# Patient Record
Sex: MALE | Race: WHITE | NOT HISPANIC OR LATINO
[De-identification: names, ages, dates, MRNs, and addresses within clinical notes are randomized per-mention and may not be internally consistent; named-entity substitution may affect disease eponyms.]

---

## 2017-06-27 ENCOUNTER — APPOINTMENT (OUTPATIENT)
Dept: GASTROENTEROLOGY | Facility: CLINIC | Age: 72
End: 2017-06-27

## 2017-06-27 VITALS
BODY MASS INDEX: 26.82 KG/M2 | WEIGHT: 161 LBS | DIASTOLIC BLOOD PRESSURE: 66 MMHG | HEIGHT: 65 IN | SYSTOLIC BLOOD PRESSURE: 120 MMHG | TEMPERATURE: 98.3 F

## 2017-06-27 DIAGNOSIS — Z87.891 PERSONAL HISTORY OF NICOTINE DEPENDENCE: ICD-10-CM

## 2017-06-27 DIAGNOSIS — Z78.9 OTHER SPECIFIED HEALTH STATUS: ICD-10-CM

## 2017-06-27 DIAGNOSIS — R13.10 DYSPHAGIA, UNSPECIFIED: ICD-10-CM

## 2017-06-29 ENCOUNTER — OUTPATIENT (OUTPATIENT)
Dept: OUTPATIENT SERVICES | Facility: HOSPITAL | Age: 72
LOS: 1 days | End: 2017-06-29
Payer: MEDICARE

## 2017-06-29 ENCOUNTER — APPOINTMENT (OUTPATIENT)
Dept: RADIOLOGY | Facility: HOSPITAL | Age: 72
End: 2017-06-29

## 2017-06-29 DIAGNOSIS — R13.10 DYSPHAGIA, UNSPECIFIED: ICD-10-CM

## 2017-06-29 PROCEDURE — 74220 X-RAY XM ESOPHAGUS 1CNTRST: CPT

## 2017-07-14 ENCOUNTER — OUTPATIENT (OUTPATIENT)
Dept: OUTPATIENT SERVICES | Facility: HOSPITAL | Age: 72
LOS: 1 days | End: 2017-07-14
Payer: MEDICARE

## 2017-07-14 DIAGNOSIS — K22.2 ESOPHAGEAL OBSTRUCTION: ICD-10-CM

## 2017-07-14 PROCEDURE — 43235 EGD DIAGNOSTIC BRUSH WASH: CPT

## 2017-08-30 ENCOUNTER — APPOINTMENT (OUTPATIENT)
Dept: GASTROENTEROLOGY | Facility: CLINIC | Age: 72
End: 2017-08-30
Payer: MEDICARE

## 2017-08-30 VITALS
WEIGHT: 165 LBS | DIASTOLIC BLOOD PRESSURE: 62 MMHG | TEMPERATURE: 98.9 F | SYSTOLIC BLOOD PRESSURE: 138 MMHG | BODY MASS INDEX: 27.49 KG/M2 | HEIGHT: 65 IN

## 2017-08-30 DIAGNOSIS — K21.0 GASTRO-ESOPHAGEAL REFLUX DISEASE WITH ESOPHAGITIS: ICD-10-CM

## 2017-08-30 PROCEDURE — 99213 OFFICE O/P EST LOW 20 MIN: CPT

## 2017-08-30 RX ORDER — ASPIRIN 81 MG/1
81 TABLET ORAL DAILY
Refills: 0 | Status: DISCONTINUED | COMMUNITY

## 2017-09-05 ENCOUNTER — APPOINTMENT (OUTPATIENT)
Dept: GASTROENTEROLOGY | Facility: CLINIC | Age: 72
End: 2017-09-05
Payer: MEDICARE

## 2017-09-06 ENCOUNTER — APPOINTMENT (OUTPATIENT)
Dept: GASTROENTEROLOGY | Facility: CLINIC | Age: 72
End: 2017-09-06
Payer: MEDICARE

## 2017-09-06 PROCEDURE — 45378 DIAGNOSTIC COLONOSCOPY: CPT

## 2017-09-13 ENCOUNTER — APPOINTMENT (OUTPATIENT)
Dept: GASTROENTEROLOGY | Facility: CLINIC | Age: 72
End: 2017-09-13

## 2017-11-06 ENCOUNTER — RESULT REVIEW (OUTPATIENT)
Age: 72
End: 2017-11-06

## 2017-11-06 ENCOUNTER — OUTPATIENT (OUTPATIENT)
Dept: OUTPATIENT SERVICES | Facility: HOSPITAL | Age: 72
LOS: 1 days | End: 2017-11-06
Payer: MEDICARE

## 2017-11-06 DIAGNOSIS — K20.9 ESOPHAGITIS, UNSPECIFIED: ICD-10-CM

## 2017-11-06 LAB — GLUCOSE BLDC GLUCOMTR-MCNC: 108 MG/DL — HIGH (ref 70–99)

## 2017-11-06 PROCEDURE — 88305 TISSUE EXAM BY PATHOLOGIST: CPT | Mod: 26

## 2017-11-06 PROCEDURE — 43239 EGD BIOPSY SINGLE/MULTIPLE: CPT

## 2017-11-06 PROCEDURE — 88312 SPECIAL STAINS GROUP 1: CPT | Mod: 26

## 2017-11-06 PROCEDURE — 88305 TISSUE EXAM BY PATHOLOGIST: CPT

## 2017-11-06 PROCEDURE — 82962 GLUCOSE BLOOD TEST: CPT

## 2017-11-06 PROCEDURE — 88312 SPECIAL STAINS GROUP 1: CPT

## 2017-11-27 ENCOUNTER — EMERGENCY (EMERGENCY)
Facility: HOSPITAL | Age: 72
LOS: 1 days | Discharge: HOME | End: 2017-11-27
Attending: EMERGENCY MEDICINE | Admitting: EMERGENCY MEDICINE
Payer: MEDICARE

## 2017-11-27 VITALS
RESPIRATION RATE: 16 BRPM | DIASTOLIC BLOOD PRESSURE: 69 MMHG | TEMPERATURE: 99 F | OXYGEN SATURATION: 100 % | HEART RATE: 97 BPM | SYSTOLIC BLOOD PRESSURE: 157 MMHG

## 2017-11-27 LAB
ALBUMIN SERPL ELPH-MCNC: 4 G/DL — SIGNIFICANT CHANGE UP (ref 3.3–5)
ALP SERPL-CCNC: 52 U/L — SIGNIFICANT CHANGE UP (ref 40–120)
ALT FLD-CCNC: 21 U/L RC — SIGNIFICANT CHANGE UP (ref 10–45)
ANION GAP SERPL CALC-SCNC: 13 MMOL/L — SIGNIFICANT CHANGE UP (ref 5–17)
APTT BLD: 28.6 SEC — SIGNIFICANT CHANGE UP (ref 27.5–37.4)
AST SERPL-CCNC: 23 U/L — SIGNIFICANT CHANGE UP (ref 10–40)
BASE EXCESS BLDV CALC-SCNC: 1.6 MMOL/L — SIGNIFICANT CHANGE UP (ref -2–2)
BASOPHILS # BLD AUTO: 0 K/UL — SIGNIFICANT CHANGE UP (ref 0–0.2)
BASOPHILS NFR BLD AUTO: 0.5 % — SIGNIFICANT CHANGE UP (ref 0–2)
BILIRUB SERPL-MCNC: 0.4 MG/DL — SIGNIFICANT CHANGE UP (ref 0.2–1.2)
BUN SERPL-MCNC: 24 MG/DL — HIGH (ref 7–23)
CA-I SERPL-SCNC: 1.22 MMOL/L — SIGNIFICANT CHANGE UP (ref 1.12–1.3)
CALCIUM SERPL-MCNC: 9.1 MG/DL — SIGNIFICANT CHANGE UP (ref 8.4–10.5)
CHLORIDE BLDV-SCNC: 104 MMOL/L — SIGNIFICANT CHANGE UP (ref 96–108)
CHLORIDE SERPL-SCNC: 99 MMOL/L — SIGNIFICANT CHANGE UP (ref 96–108)
CO2 BLDV-SCNC: 30 MMOL/L — SIGNIFICANT CHANGE UP (ref 22–30)
CO2 SERPL-SCNC: 25 MMOL/L — SIGNIFICANT CHANGE UP (ref 22–31)
CREAT SERPL-MCNC: 1.07 MG/DL — SIGNIFICANT CHANGE UP (ref 0.5–1.3)
CRP SERPL-MCNC: 5.4 MG/DL — HIGH (ref 0–0.4)
EOSINOPHIL # BLD AUTO: 0.1 K/UL — SIGNIFICANT CHANGE UP (ref 0–0.5)
EOSINOPHIL NFR BLD AUTO: 1.1 % — SIGNIFICANT CHANGE UP (ref 0–6)
ERYTHROCYTE [SEDIMENTATION RATE] IN BLOOD: 26 MM/HR — HIGH (ref 0–20)
GAS PNL BLDV: 135 MMOL/L — LOW (ref 136–145)
GAS PNL BLDV: SIGNIFICANT CHANGE UP
GLUCOSE BLDV-MCNC: 213 MG/DL — HIGH (ref 70–99)
GLUCOSE SERPL-MCNC: 208 MG/DL — HIGH (ref 70–99)
HCO3 BLDV-SCNC: 28 MMOL/L — SIGNIFICANT CHANGE UP (ref 21–29)
HCT VFR BLD CALC: 42.1 % — SIGNIFICANT CHANGE UP (ref 39–50)
HCT VFR BLDA CALC: 45 % — SIGNIFICANT CHANGE UP (ref 39–50)
HGB BLD CALC-MCNC: 14.5 G/DL — SIGNIFICANT CHANGE UP (ref 13–17)
HGB BLD-MCNC: 14.2 G/DL — SIGNIFICANT CHANGE UP (ref 13–17)
INR BLD: 1.09 RATIO — SIGNIFICANT CHANGE UP (ref 0.88–1.16)
LACTATE BLDV-MCNC: 1.9 MMOL/L — SIGNIFICANT CHANGE UP (ref 0.7–2)
LYMPHOCYTES # BLD AUTO: 1.1 K/UL — SIGNIFICANT CHANGE UP (ref 1–3.3)
LYMPHOCYTES # BLD AUTO: 11.4 % — LOW (ref 13–44)
MCHC RBC-ENTMCNC: 31.4 PG — SIGNIFICANT CHANGE UP (ref 27–34)
MCHC RBC-ENTMCNC: 33.6 GM/DL — SIGNIFICANT CHANGE UP (ref 32–36)
MCV RBC AUTO: 93.3 FL — SIGNIFICANT CHANGE UP (ref 80–100)
MONOCYTES # BLD AUTO: 0.8 K/UL — SIGNIFICANT CHANGE UP (ref 0–0.9)
MONOCYTES NFR BLD AUTO: 8.3 % — SIGNIFICANT CHANGE UP (ref 2–14)
NEUTROPHILS # BLD AUTO: 7.4 K/UL — SIGNIFICANT CHANGE UP (ref 1.8–7.4)
NEUTROPHILS NFR BLD AUTO: 78.7 % — HIGH (ref 43–77)
OTHER CELLS CSF MANUAL: 10 ML/DL — LOW (ref 18–22)
PCO2 BLDV: 54 MMHG — HIGH (ref 35–50)
PH BLDV: 7.33 — LOW (ref 7.35–7.45)
PLATELET # BLD AUTO: 259 K/UL — SIGNIFICANT CHANGE UP (ref 150–400)
PO2 BLDV: 32 MMHG — SIGNIFICANT CHANGE UP (ref 25–45)
POTASSIUM BLDV-SCNC: 4.2 MMOL/L — SIGNIFICANT CHANGE UP (ref 3.5–5)
POTASSIUM SERPL-MCNC: 4.2 MMOL/L — SIGNIFICANT CHANGE UP (ref 3.5–5.3)
POTASSIUM SERPL-SCNC: 4.2 MMOL/L — SIGNIFICANT CHANGE UP (ref 3.5–5.3)
PROT SERPL-MCNC: 7.2 G/DL — SIGNIFICANT CHANGE UP (ref 6–8.3)
PROTHROM AB SERPL-ACNC: 11.8 SEC — SIGNIFICANT CHANGE UP (ref 9.8–12.7)
RBC # BLD: 4.51 M/UL — SIGNIFICANT CHANGE UP (ref 4.2–5.8)
RBC # FLD: 11.6 % — SIGNIFICANT CHANGE UP (ref 10.3–14.5)
SAO2 % BLDV: 52 % — LOW (ref 67–88)
SODIUM SERPL-SCNC: 137 MMOL/L — SIGNIFICANT CHANGE UP (ref 135–145)
WBC # BLD: 9.4 K/UL — SIGNIFICANT CHANGE UP (ref 3.8–10.5)
WBC # FLD AUTO: 9.4 K/UL — SIGNIFICANT CHANGE UP (ref 3.8–10.5)

## 2017-11-27 PROCEDURE — 93010 ELECTROCARDIOGRAM REPORT: CPT | Mod: 59,GC

## 2017-11-27 PROCEDURE — 20610 DRAIN/INJ JOINT/BURSA W/O US: CPT | Mod: GC

## 2017-11-27 PROCEDURE — 73564 X-RAY EXAM KNEE 4 OR MORE: CPT | Mod: 26,RT

## 2017-11-27 PROCEDURE — 71020: CPT | Mod: 26

## 2017-11-27 PROCEDURE — 99218: CPT | Mod: 25,GC

## 2017-11-27 RX ORDER — SODIUM CHLORIDE 9 MG/ML
3 INJECTION INTRAMUSCULAR; INTRAVENOUS; SUBCUTANEOUS EVERY 8 HOURS
Qty: 0 | Refills: 0 | Status: DISCONTINUED | OUTPATIENT
Start: 2017-11-27 | End: 2017-12-01

## 2017-11-27 RX ORDER — SODIUM CHLORIDE 9 MG/ML
1000 INJECTION INTRAMUSCULAR; INTRAVENOUS; SUBCUTANEOUS ONCE
Qty: 0 | Refills: 0 | Status: COMPLETED | OUTPATIENT
Start: 2017-11-27 | End: 2017-11-27

## 2017-11-27 RX ORDER — CLONAZEPAM 1 MG
0.5 TABLET ORAL AT BEDTIME
Qty: 0 | Refills: 0 | Status: COMPLETED | OUTPATIENT
Start: 2017-11-27 | End: 2017-12-04

## 2017-11-27 RX ADMIN — SODIUM CHLORIDE 2000 MILLILITER(S): 9 INJECTION INTRAMUSCULAR; INTRAVENOUS; SUBCUTANEOUS at 19:10

## 2017-11-27 RX ADMIN — Medication 100 MILLIGRAM(S): at 19:12

## 2017-11-27 RX ADMIN — Medication 0.5 MILLIGRAM(S): at 22:30
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
## 2017-11-27 NOTE — ED PROCEDURE NOTE - CPROC ED POST PROC CARE GUIDE1
Elevate the injured extremity as instructed./Instructed patient/caregiver to follow-up with primary care physician.
Instructed patient/caregiver regarding signs and symptoms of infection./Keep the cast/splint/dressing clean and dry./Instructed patient/caregiver to follow-up with primary care physician./Verbal/written post procedure instructions were given to patient/caregiver.

## 2017-11-27 NOTE — ED PROVIDER NOTE - NS ED ROS FT
No fever, no chills, no change in vision, no change in hearing, no chest pain, no shortness of breath, no abdominal pain, no vomiting, no dysuria, + L knee swelling and pain, + redness, no loss of consciousness. ~ Barry Guido MD

## 2017-11-27 NOTE — ED CDU PROVIDER DISPOSITION NOTE - ATTENDING CONTRIBUTION TO CARE
73y/o M w Hx of diet-controlled DM and PTSD, who p/w R knee swelling, redness and pain for last 5 days. pt was seen in ED and dx w prepatellar bursitis and cellulitis, observed in CDU for ongoing eval, iv abx. pt was seen by his PMD in the CDU as well. pt's cellulitis improved significantly over CDU course w iv abx. reassessed by me at 1400 - able to fully range knee, no joint stiffness or erythema. localized erythema/swelling over patella. pt afebrile. will f/u w pmd and continue PO abx upon dc. additional verbal instructions regarding diagnosis, return precautions and follow up plan given to pt and/or family. NOA Alford MD

## 2017-11-27 NOTE — ED PROCEDURE NOTE - PROCEDURE ADDITIONAL DETAILS
R knee with prepatellar bursitis with surrounding cobblestoning. Also with R knee effusion, mild in amount.
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
## 2017-11-27 NOTE — ED CDU PROVIDER DISPOSITION NOTE - PLAN OF CARE
1. Rest and elevate affected area.   2. Take Motrin 600mg every 8 hours with food for pain. Take Clindamycin 450mg every 8 hrs x 10 days.  3. Follow up with your PMD within 48-72hours.   4. Any worsening redness, swelling, streaking (red lines), fever, chills return to ER

## 2017-11-27 NOTE — ED PROVIDER NOTE - PHYSICAL EXAMINATION
Physical Exam: elderly M who is in NAD, AAOx3, NCAT, MMM, neck is supple, PERRL, CTAB, normal rate and regular rhythm, abdomen is soft and NTND, No edema, No rashes, CN grossly intact, No focal motor or sensory deficits. R knee with swelling over patellar bursae with overlying healed abrasion, normal active and passive ROM of the R knee, + area of erythema of the anterior knee up to the medial lower thigh. No crepitus or bullae~ Barry Guido MD
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
## 2017-11-27 NOTE — ED PROVIDER NOTE - MEDICAL DECISION MAKING DETAILS
Barry Guido MD (resident): 72 M w/ DM who p/w R knee swelling and erythema, consistent with cellulitis overlying prepatellar bursitis. Will get imaging and labs and Tx w/ IV atbx. Likely CDU for IV atbx and reassessment of area of redness.

## 2017-11-27 NOTE — ED PROCEDURE NOTE - CPROC ED TIME OUT STATEMENT1
“Patient's name, , procedure and correct site were confirmed during the [place] Timeout.”
“Patient's name, , procedure and correct site were confirmed during the [place] Timeout.”

## 2017-11-27 NOTE — ED PROCEDURE NOTE - ATTENDING CONTRIBUTION TO CARE
Emergency Department Focused Ultrasound performed at patient's bedside for educational purposes. The study will have a follow up study performed or was performed in the direct supervision of an ultrasound trained attending.
ace wrap applied to right knee
prepatellar bursa tapped - first cleaned with chlorhexadine x 3 - 18gauge needle inserted into prepatellar bursa - 2 cc fluid obtained. pt tolerate procedure well - ace wrap applied.

## 2017-11-28 VITALS
OXYGEN SATURATION: 95 % | SYSTOLIC BLOOD PRESSURE: 147 MMHG | RESPIRATION RATE: 18 BRPM | TEMPERATURE: 98 F | DIASTOLIC BLOOD PRESSURE: 72 MMHG | HEART RATE: 76 BPM

## 2017-11-28 PROCEDURE — 87075 CULTR BACTERIA EXCEPT BLOOD: CPT

## 2017-11-28 PROCEDURE — 85652 RBC SED RATE AUTOMATED: CPT

## 2017-11-28 PROCEDURE — 87040 BLOOD CULTURE FOR BACTERIA: CPT

## 2017-11-28 PROCEDURE — 82435 ASSAY OF BLOOD CHLORIDE: CPT

## 2017-11-28 PROCEDURE — 84295 ASSAY OF SERUM SODIUM: CPT

## 2017-11-28 PROCEDURE — 85730 THROMBOPLASTIN TIME PARTIAL: CPT

## 2017-11-28 PROCEDURE — G0378: CPT

## 2017-11-28 PROCEDURE — 82947 ASSAY GLUCOSE BLOOD QUANT: CPT

## 2017-11-28 PROCEDURE — 87205 SMEAR GRAM STAIN: CPT

## 2017-11-28 PROCEDURE — 93005 ELECTROCARDIOGRAM TRACING: CPT | Mod: XU

## 2017-11-28 PROCEDURE — 96376 TX/PRO/DX INJ SAME DRUG ADON: CPT | Mod: XU

## 2017-11-28 PROCEDURE — 85014 HEMATOCRIT: CPT

## 2017-11-28 PROCEDURE — 85610 PROTHROMBIN TIME: CPT

## 2017-11-28 PROCEDURE — 82803 BLOOD GASES ANY COMBINATION: CPT

## 2017-11-28 PROCEDURE — 99217: CPT | Mod: 25,GC

## 2017-11-28 PROCEDURE — 82330 ASSAY OF CALCIUM: CPT

## 2017-11-28 PROCEDURE — 71046 X-RAY EXAM CHEST 2 VIEWS: CPT

## 2017-11-28 PROCEDURE — 96374 THER/PROPH/DIAG INJ IV PUSH: CPT | Mod: XU

## 2017-11-28 PROCEDURE — 99284 EMERGENCY DEPT VISIT MOD MDM: CPT | Mod: 25

## 2017-11-28 PROCEDURE — 20610 DRAIN/INJ JOINT/BURSA W/O US: CPT | Mod: RT

## 2017-11-28 PROCEDURE — 87070 CULTURE OTHR SPECIMN AEROBIC: CPT

## 2017-11-28 PROCEDURE — 86140 C-REACTIVE PROTEIN: CPT

## 2017-11-28 PROCEDURE — 80053 COMPREHEN METABOLIC PANEL: CPT

## 2017-11-28 PROCEDURE — 84132 ASSAY OF SERUM POTASSIUM: CPT

## 2017-11-28 PROCEDURE — 83605 ASSAY OF LACTIC ACID: CPT

## 2017-11-28 PROCEDURE — 73564 X-RAY EXAM KNEE 4 OR MORE: CPT

## 2017-11-28 PROCEDURE — 85027 COMPLETE CBC AUTOMATED: CPT

## 2017-11-28 RX ADMIN — Medication 100 MILLIGRAM(S): at 03:13

## 2017-11-28 RX ADMIN — Medication 100 MILLIGRAM(S): at 11:05

## 2017-11-28 RX ADMIN — SODIUM CHLORIDE 3 MILLILITER(S): 9 INJECTION INTRAMUSCULAR; INTRAVENOUS; SUBCUTANEOUS at 05:47
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
## 2017-11-28 NOTE — CONSULT NOTE ADULT - ASSESSMENT
Vital Signs Last 24 Hrs  T(C): 36.8 (28 Nov 2017 13:13), Max: 37.6 (28 Nov 2017 03:12)  T(F): 98.3 (28 Nov 2017 13:13), Max: 99.6 (28 Nov 2017 03:12)  HR: 76 (28 Nov 2017 13:13) (63 - 97)  BP: 147/72 (28 Nov 2017 13:13) (101/64 - 157/69)  BP(mean): --  RR: 18 (28 Nov 2017 13:13) (16 - 18)  SpO2: 95% (28 Nov 2017 13:13) (95% - 100%)    Daily     Daily     I&O's Detail      Daily     CAPILLARY BLOOD GLUCOSE          MEDICATIONS  (STANDING):  clindamycin IVPB 600 milliGRAM(s) IV Intermittent every 8 hours  clonazePAM Tablet 0.5 milliGRAM(s) Oral at bedtime  sodium chloride 0.9% lock flush 3 milliLiter(s) IV Push every 8 hours    MEDICATIONS  (PRN):      Labs:                          14.2   9.4   )-----------( 259      ( 27 Nov 2017 19:16 )             42.1     PT/INR - ( 27 Nov 2017 19:16 )   PT: 11.8 sec;   INR: 1.09 ratio         PTT - ( 27 Nov 2017 19:16 )  PTT:28.6 sec  11-27    137  |  99  |  24<H>  ----------------------------<  208<H>  4.2   |  25  |  1.07    Ca    9.1      27 Nov 2017 19:16    TPro  7.2  /  Alb  4.0  /  TBili  0.4  /  DBili  x   /  AST  23  /  ALT  21  /  AlkPhos  52  11-27            [id number] @ 00:50  Knee Right Knee  n/a    polymorphonuclear leukocytes seen  No organisms seen  by cytocentrifuge  n/a  n/a  n/a  n/a    5FC: n/a  AMK: n/a  AMB:  n/a  AMP: n/a  A/S: n/a  AND: n/a  AZM: n/a  AZT: n/a  EZIO: n/a  CFZ: n/a  CFP: n/a  : n/a  CFX: n/a  CTZ: n/a  C/A: n/a  CTX: n/a  CFU: n/a  CHL: n/a  CIP: n/a  CLI: n/a  DAP: n/a  ERT: n/a  BAIRON: n/a  FLU: n/a  GEN: n/a  IMP: n/a  ITR: n/a  LEV: n/a  BREN: n/a  NKECHI: n/a  JUAN: n/a  MOX: n/a  OXA: n/a  PCN: n/a  P/T: n/a  POS: n/a  RIF: n/a  TCN: n/a  TGC: n/a  TOB: n/a  T/S: n/a  VAN: n/a  VORI: n/a

## 2017-11-28 NOTE — ED CDU PROVIDER SUBSEQUENT DAY NOTE - HISTORY
No interval changes since initial CDU provider note. pt denies pain, worsening symptoms. Pt now sleeping comfortably. HIREN Aldridge

## 2017-11-28 NOTE — ED ADULT NURSE REASSESSMENT NOTE - COMFORT CARE
plan of care explained/repositioned/warm blanket provided/wait time explained
plan of care explained/repositioned/assisted to bathroom/po fluids offered/side rails up
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
## 2017-11-28 NOTE — ED CDU PROVIDER SUBSEQUENT DAY NOTE - ATTENDING CONTRIBUTION TO CARE
71y/o M w Hx of diet-controlled DM and PTSD, who p/w R knee swelling, redness and pain for last 5 days. pt was seen in ED and dx w prepatellar bursitis and cellulitis, observed in CDU for ongoing eval, iv abx. pt was seen by his PMD in the CDU as well. pt's cellulitis improved significantly over CDU course w iv abx. reassessed by me at 1400 - able to fully range knee, no joint stiffness or erythema. localized erythema/swelling over patella. pt afebrile. will f/u w pmd and continue PO abx upon dc. additional verbal instructions regarding diagnosis, return precautions and follow up plan given to pt and/or family. NOA Alford MD

## 2017-12-02 LAB
CULTURE RESULTS: SIGNIFICANT CHANGE UP
CULTURE RESULTS: SIGNIFICANT CHANGE UP
SPECIMEN SOURCE: SIGNIFICANT CHANGE UP
SPECIMEN SOURCE: SIGNIFICANT CHANGE UP

## 2018-01-23 ENCOUNTER — RX RENEWAL (OUTPATIENT)
Age: 73
End: 2018-01-23

## 2018-05-21 ENCOUNTER — APPOINTMENT (OUTPATIENT)
Dept: GASTROENTEROLOGY | Facility: CLINIC | Age: 73
End: 2018-05-21

## 2018-08-06 ENCOUNTER — RX RENEWAL (OUTPATIENT)
Age: 73
End: 2018-08-06

## 2018-11-30 ENCOUNTER — EMERGENCY (EMERGENCY)
Facility: HOSPITAL | Age: 73
LOS: 1 days | End: 2018-11-30
Attending: EMERGENCY MEDICINE
Payer: MEDICARE

## 2018-11-30 VITALS
RESPIRATION RATE: 20 BRPM | SYSTOLIC BLOOD PRESSURE: 154 MMHG | DIASTOLIC BLOOD PRESSURE: 95 MMHG | OXYGEN SATURATION: 95 % | TEMPERATURE: 98 F | HEART RATE: 75 BPM | WEIGHT: 169.98 LBS

## 2018-11-30 LAB
ALBUMIN SERPL ELPH-MCNC: 4.5 G/DL — SIGNIFICANT CHANGE UP (ref 3.3–5)
ALP SERPL-CCNC: 47 U/L — SIGNIFICANT CHANGE UP (ref 40–120)
ALT FLD-CCNC: 20 U/L — SIGNIFICANT CHANGE UP (ref 10–45)
ANION GAP SERPL CALC-SCNC: 12 MMOL/L — SIGNIFICANT CHANGE UP (ref 5–17)
APTT BLD: 29.3 SEC — SIGNIFICANT CHANGE UP (ref 27.5–36.3)
AST SERPL-CCNC: 27 U/L — SIGNIFICANT CHANGE UP (ref 10–40)
BASOPHILS # BLD AUTO: 0.1 K/UL — SIGNIFICANT CHANGE UP (ref 0–0.2)
BASOPHILS NFR BLD AUTO: 0.9 % — SIGNIFICANT CHANGE UP (ref 0–2)
BILIRUB SERPL-MCNC: 0.4 MG/DL — SIGNIFICANT CHANGE UP (ref 0.2–1.2)
BUN SERPL-MCNC: 22 MG/DL — SIGNIFICANT CHANGE UP (ref 7–23)
CALCIUM SERPL-MCNC: 9.2 MG/DL — SIGNIFICANT CHANGE UP (ref 8.4–10.5)
CHLORIDE SERPL-SCNC: 100 MMOL/L — SIGNIFICANT CHANGE UP (ref 96–108)
CK MB CFR SERPL CALC: 5.6 NG/ML — SIGNIFICANT CHANGE UP (ref 0–6.7)
CK SERPL-CCNC: 279 U/L — HIGH (ref 30–200)
CO2 SERPL-SCNC: 24 MMOL/L — SIGNIFICANT CHANGE UP (ref 22–31)
CREAT SERPL-MCNC: 0.93 MG/DL — SIGNIFICANT CHANGE UP (ref 0.5–1.3)
EOSINOPHIL # BLD AUTO: 0.2 K/UL — SIGNIFICANT CHANGE UP (ref 0–0.5)
EOSINOPHIL NFR BLD AUTO: 4.2 % — SIGNIFICANT CHANGE UP (ref 0–6)
GLUCOSE SERPL-MCNC: 159 MG/DL — HIGH (ref 70–99)
HCT VFR BLD CALC: 43 % — SIGNIFICANT CHANGE UP (ref 39–50)
HGB BLD-MCNC: 14.9 G/DL — SIGNIFICANT CHANGE UP (ref 13–17)
INR BLD: 1.03 RATIO — SIGNIFICANT CHANGE UP (ref 0.88–1.16)
LYMPHOCYTES # BLD AUTO: 1.2 K/UL — SIGNIFICANT CHANGE UP (ref 1–3.3)
LYMPHOCYTES # BLD AUTO: 21.6 % — SIGNIFICANT CHANGE UP (ref 13–44)
MCHC RBC-ENTMCNC: 30.5 PG — SIGNIFICANT CHANGE UP (ref 27–34)
MCHC RBC-ENTMCNC: 34.7 GM/DL — SIGNIFICANT CHANGE UP (ref 32–36)
MCV RBC AUTO: 87.9 FL — SIGNIFICANT CHANGE UP (ref 80–100)
MONOCYTES # BLD AUTO: 0.6 K/UL — SIGNIFICANT CHANGE UP (ref 0–0.9)
MONOCYTES NFR BLD AUTO: 10.5 % — SIGNIFICANT CHANGE UP (ref 2–14)
NEUTROPHILS # BLD AUTO: 3.6 K/UL — SIGNIFICANT CHANGE UP (ref 1.8–7.4)
NEUTROPHILS NFR BLD AUTO: 62.7 % — SIGNIFICANT CHANGE UP (ref 43–77)
PLATELET # BLD AUTO: 246 K/UL — SIGNIFICANT CHANGE UP (ref 150–400)
POTASSIUM SERPL-MCNC: 4.5 MMOL/L — SIGNIFICANT CHANGE UP (ref 3.5–5.3)
POTASSIUM SERPL-SCNC: 4.5 MMOL/L — SIGNIFICANT CHANGE UP (ref 3.5–5.3)
PROT SERPL-MCNC: 7.3 G/DL — SIGNIFICANT CHANGE UP (ref 6–8.3)
PROTHROM AB SERPL-ACNC: 11.8 SEC — SIGNIFICANT CHANGE UP (ref 10–12.9)
RBC # BLD: 4.89 M/UL — SIGNIFICANT CHANGE UP (ref 4.2–5.8)
RBC # FLD: 12.2 % — SIGNIFICANT CHANGE UP (ref 10.3–14.5)
SODIUM SERPL-SCNC: 136 MMOL/L — SIGNIFICANT CHANGE UP (ref 135–145)
TROPONIN T, HIGH SENSITIVITY RESULT: 8 NG/L — SIGNIFICANT CHANGE UP (ref 0–51)
TROPONIN T, HIGH SENSITIVITY RESULT: 8 NG/L — SIGNIFICANT CHANGE UP (ref 0–51)
WBC # BLD: 5.7 K/UL — SIGNIFICANT CHANGE UP (ref 3.8–10.5)
WBC # FLD AUTO: 5.7 K/UL — SIGNIFICANT CHANGE UP (ref 3.8–10.5)

## 2018-11-30 PROCEDURE — 93010 ELECTROCARDIOGRAM REPORT: CPT

## 2018-11-30 PROCEDURE — 70498 CT ANGIOGRAPHY NECK: CPT | Mod: 26

## 2018-11-30 PROCEDURE — 71045 X-RAY EXAM CHEST 1 VIEW: CPT | Mod: 26

## 2018-11-30 PROCEDURE — 70496 CT ANGIOGRAPHY HEAD: CPT | Mod: 26

## 2018-11-30 PROCEDURE — 99220: CPT

## 2018-11-30 PROCEDURE — 70450 CT HEAD/BRAIN W/O DYE: CPT | Mod: 26,59

## 2018-11-30 RX ORDER — ASPIRIN/CALCIUM CARB/MAGNESIUM 324 MG
81 TABLET ORAL DAILY
Qty: 0 | Refills: 0 | Status: DISCONTINUED | OUTPATIENT
Start: 2018-11-30 | End: 2018-12-04

## 2018-11-30 RX ORDER — CLOPIDOGREL BISULFATE 75 MG/1
300 TABLET, FILM COATED ORAL ONCE
Qty: 0 | Refills: 0 | Status: COMPLETED | OUTPATIENT
Start: 2018-11-30 | End: 2018-11-30

## 2018-11-30 RX ORDER — CLONAZEPAM 1 MG
1 TABLET ORAL ONCE
Qty: 0 | Refills: 0 | Status: DISCONTINUED | OUTPATIENT
Start: 2018-11-30 | End: 2018-11-30

## 2018-11-30 RX ORDER — KETOROLAC TROMETHAMINE 30 MG/ML
15 SYRINGE (ML) INJECTION ONCE
Qty: 0 | Refills: 0 | Status: DISCONTINUED | OUTPATIENT
Start: 2018-11-30 | End: 2018-11-30

## 2018-11-30 RX ADMIN — Medication 81 MILLIGRAM(S): at 20:52

## 2018-11-30 RX ADMIN — CLOPIDOGREL BISULFATE 300 MILLIGRAM(S): 75 TABLET, FILM COATED ORAL at 20:52

## 2018-11-30 RX ADMIN — Medication 15 MILLIGRAM(S): at 22:00

## 2018-11-30 RX ADMIN — Medication 1 MILLIGRAM(S): at 20:58

## 2018-11-30 RX ADMIN — Medication 15 MILLIGRAM(S): at 21:01
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
## 2018-11-30 NOTE — ED CDU PROVIDER DISPOSITION NOTE - PLAN OF CARE
1. Continue your home medications as directed. Take Aspirin 81mg daily and Plavix 75mg daily until further notice.  2. Drink plenty of fluids to stay hydrated.  3. You will need to follow up with your PMD and neurologist or our neurology clinic at [phone] in 2-3 days. A copy of your results were given to you to bring to your appt.   4. Return to ER for headache, confusion, behavior/speech changes, numbness/tingling/weakness in your arms/legs, or any other concerns.

## 2018-11-30 NOTE — ED PROVIDER NOTE - CRITICAL CARE PROVIDED
interpretation of diagnostic studies/documentation/direct patient care (not related to procedure)/additional history taking

## 2018-11-30 NOTE — CONSULT NOTE ADULT - SUBJECTIVE AND OBJECTIVE BOX
SANDRA [id number] is a 73y old  Male who presents with a chief complaint of     HPI: 74 y/o  male with past medical history of DM2, PTSD, Anxiety presents to the ED as Code Stroke. Patient states he woke up in his usual state of health and proceeded about his day. Around 11am while sitting in a diner, patient began to feel "foggy" and states he started to feel confused with slight blurring of his vision (both eyes). Patient then felt as if he was going to "pass out" and decided to walk to his doctor's office. As per patient's son, patient was able to follow commands and speak however, his speech seemed mumbled compared to baseline. Patient denied any accompanying numbness, tingling, unilateral weakness, problems with coordination.   At the time of my interview, patient states he is back to normal and, as per family, is back to baseline.     MEDICATIONS  (STANDING):    MEDICATIONS  (PRN):    PAST MEDICAL & SURGICAL HISTORY:  Borderline diabetic  PTSD (post-traumatic stress disorder)  No significant past surgical history    FAMILY HISTORY:    Allergies    No Known Allergies    Intolerances        SHx - No smoking, No ETOH, No drug abuse      Review of Systems:  As per HPI, otherwise negative.    Vital Signs Last 24 Hrs  T(C): 36.7 (30 Nov 2018 14:41), Max: 36.7 (30 Nov 2018 14:41)  T(F): 98 (30 Nov 2018 14:41), Max: 98 (30 Nov 2018 14:41)  HR: 75 (30 Nov 2018 14:41) (75 - 75)  BP: 154/95 (30 Nov 2018 14:41) (154/95 - 154/95)  BP(mean): --  RR: 20 (30 Nov 2018 14:41) (20 - 20)  SpO2: 95% (30 Nov 2018 14:41) (95% - 95%)    General Exam:   General appearance: No acute distress      Neurological Exam:  Mental Status: Orientated to self, date and place.  Attention intact.  No dysarthria. Speech fluent.  Cranial Nerves:   PERRL, EOMI, VFF, no nystagmus.    CN V1-3 intact to light touch .  No facial asymmetry.  Hearing intact to finger rub bilaterally.  Tongue, uvula and palate midline.  Sternocleidomastoid and Trapezius intact bilaterally.    Motor:   Tone: normal.                  Strength:     [] Upper extremity                      Delt       Bicep    Tricep                                                  R         5/5        5/5        5/5       5/5                                               L          5/5        5/5        5/5       5/5  [] Lower extremity                       HF          KE          KF        DF         PF                                               R        5/5 5/5        5/5       5/5       5/5                                               L         5/5        5/5       5/5       5/5        5/5  Pronator drift: none.                  Dysmetria: None to finger-nose-finger  Tremor: No resting, postural or action tremor.  No myoclonus.    Sensation: intact to light touch, temperature      Gait: Deferred      Other:    11-30    136  |  100  |  22  ----------------------------<  159<H>  4.5   |  24  |  0.93    Ca    9.2      30 Nov 2018 15:07    TPro  7.3  /  Alb  4.5  /  TBili  0.4  /  DBili  x   /  AST  27  /  ALT  20  /  AlkPhos  47  11-30                            14.9   5.7   )-----------( 246      ( 30 Nov 2018 15:07 )             43.0       Radiology    CT: < from: CT Brain Stroke Protocol (11.30.18 @ 15:17) >  IMPRESSION: No acute intracranial hemorrhage, mass effect, or shift of   the midline structures.    < end of copied text >    MRI  EKG:  tele:  TTE:  EEG:

## 2018-11-30 NOTE — ED CDU PROVIDER INITIAL DAY NOTE - MEDICAL DECISION MAKING DETAILS
73 y old male with stroke code  .negative ct scan   ,will observe in CDU ,mri IN am with neuro eval tomorrow ZR

## 2018-11-30 NOTE — ED PROVIDER NOTE - MEDICAL DECISION MAKING DETAILS
72 yo M with pmhx dm and PTSD presenting with confusion and ha x 1100 AM today. CODE Stroke called 74 yo M with pmhx dm and PTSD presenting with confusion and ha x 1100 AM today. CODE Stroke called will obtain stat CT scan neuro cons and on reevaluation: YESSENIA

## 2018-11-30 NOTE — ED ADULT NURSE NOTE - NSIMPLEMENTINTERV_GEN_ALL_ED
Implemented All Fall Risk Interventions:  [place] to call system. Call bell, personal items and telephone within reach. Instruct patient to call for assistance. Room bathroom lighting operational. Non-slip footwear when patient is off stretcher. Physically safe environment: no spills, clutter or unnecessary equipment. Stretcher in lowest position, wheels locked, appropriate side rails in place. Provide visual cue, wrist band, yellow gown, etc. Monitor gait and stability. Monitor for mental status changes and reorient to person, place, and time. Review medications for side effects contributing to fall risk. Reinforce activity limits and safety measures with patient and family.

## 2018-11-30 NOTE — ED ADULT NURSE NOTE - OBJECTIVE STATEMENT
73 year old male a/x3 brought in by ambulance with pmh of DM and PTSD presenting with resolved confusion and ha that started at 1100 AM today. Patient states he was fine when he woke up and then became confused while he was driving. Patient states he forgot what he was doing while at the PCP office. Patients son noticed him "mumbling" when he arrived, symptoms have resolved since arriving in ED . Patient states he feels better but persistent ha. Patient denies blurred vision, cp, sob, tingling, numbness, weakness, trauma, neck pain, back pain, abd pain, nvd, dysuria and hematuria

## 2018-11-30 NOTE — ED PROVIDER NOTE - CHPI ED SYMPTOMS NEG
no numbness/no blurred vision/no dizziness/no fever/no weakness/no loss of consciousness/no nausea/no change in level of consciousness/no vomiting no change in level of consciousness/no blurred vision/no loss of consciousness/no nausea/no numbness/no dizziness/no fever/no vomiting

## 2018-11-30 NOTE — ED PROVIDER NOTE - OBJECTIVE STATEMENT
72 yo M with pmhx dm and PTSD presenting with confusion and ha x 1100 AM today. Patient states he was fine when he woke up and then became confused while he was driving. Patient states he forgot home to do things while he was at his pcp office. Patient states recently ran out of his klonopin. Patients son noticed him "mumbling" when he arrived. Patient states he feels better. Admits to ha. Patient denies blurred vision, cp, sob, tingling, numbness, weakness, trauma, neck pain, back pain, abd pain, nvd, dysuria and hematuria 72 yo M with pmhx dm and PTSD presenting with confusion and ha x 1100 AM today. Patient states he was fine when he woke up and then became confused while he was driving. Patient states he forgot home to do things while he was at his pcp office. Patient states recently ran out of his Klonopin. Patients son noticed him "mumbling" when he arrived. Patient states he feels better. Admits to ha. Patient denies blurred vision, cp, sob, tingling, numbness, weakness, trauma, neck pain, back pain, abd pain, nvd, dysuria and hematuria

## 2018-11-30 NOTE — ED CDU PROVIDER DISPOSITION NOTE - ATTENDING CONTRIBUTION TO CARE
Attending MD Orantes:   I personally have seen and examined this patient.  Physician assistant note reviewed and agree on plan of care and except where noted.  See HPI, PE, and MDM for details.

## 2018-11-30 NOTE — ED CDU PROVIDER INITIAL DAY NOTE - CHPI ED SYMPTOMS NEG
no blurred vision/no numbness/no vomiting/no loss of consciousness/no nausea/no dizziness/no fever/no change in level of consciousness/no weakness

## 2018-11-30 NOTE — ED CDU PROVIDER DISPOSITION NOTE - CLINICAL COURSE
72 yo M with pmhx diet controlled DM and PTSD presenting with confusion at 1100 AM today. Patient states he was fine when he woke up and then became confused while he was driving. He thought it was secondary to not eating so he went to the diner to eat, but symptoms persisted. Patient states recently ran out of his Klonopin. Patients son and wife noticed him "mumbling" when he arrived. Patient states he feels better now. Admits to ha. Patient denies blurred vision, cp, sob, tingling, numbness, weakness, trauma, neck pain, back pain, abd pain, nvd, dysuria and hematuria.  In ED, , trops negative, other labs unremarkable, CXR negative. neuro consulted, recommended CTH, CTA H&N, aspirin and plavix. CTH negative. No MRI as pt cannot tolerate 2/2 claustrophobia. pt sent to CDU for workup and monitoring.   In CDU, CTA H&N negative, neuro checks normal. 72 yo M with pmhx diet controlled DM and PTSD presenting with confusion at 1100 AM today. Patient states he was fine when he woke up and then became confused while he was driving. He thought it was secondary to not eating so he went to the diner to eat, but symptoms persisted. Patient states recently ran out of his Klonopin. Patients son and wife noticed him "mumbling" when he arrived. Patient states he feels better now. Admits to ha. Patient denies blurred vision, cp, sob, tingling, numbness, weakness, trauma, neck pain, back pain, abd pain, nvd, dysuria and hematuria.  In ED, , trops negative, other labs unremarkable, CXR negative. neuro consulted, recommended CTH, CTA H&N, aspirin and plavix. CTH negative. No MRI as pt cannot tolerate 2/2 claustrophobia and d/t shrapnel in neck and chest. pt sent to CDU for workup and monitoring.   In CDU, CTA H&N negative, neuro checks normal, no event son telemetry, pt feeling well with no complaints, pt to be d/cd with asa 81mg QD and plavix 75mg QD, neurology follow up. Case d/w attending. 72 yo M with pmhx diet controlled DM and PTSD presenting with confusion at 1100 AM today. Patient states he was fine when he woke up and then became confused while he was driving. He thought it was secondary to not eating so he went to the diner to eat, but symptoms persisted. Patient states recently ran out of his Klonopin. Patients son and wife noticed him "mumbling" when he arrived. Patient states he feels better now. Admits to ha. Patient denies blurred vision, cp, sob, tingling, numbness, weakness, trauma, neck pain, back pain, abd pain, nvd, dysuria and hematuria.  In ED,  (repeat 231), trops negative, other labs unremarkable, CXR negative. neuro consulted, recommended CTH, CTA H&N, aspirin and plavix. CTH negative. No MRI as pt cannot tolerate 2/2 claustrophobia and d/t shrapnel in neck and chest. pt sent to CDU for workup and monitoring.   In CDU, CTA H&N negative, neuro checks normal, no event son telemetry. A1c______, Lipids_______ pt feeling well with no complaints, pt to be d/cd with asa 81mg QD and plavix 75mg QD, neurology follow up. Case d/w attending. 74 yo M with pmhx diet controlled DM and PTSD presenting with confusion at 1100 AM today. Patient states he was fine when he woke up and then became confused while he was driving. He thought it was secondary to not eating so he went to the diner to eat, but symptoms persisted. Patient states recently ran out of his Klonopin. Patients son and wife noticed him "mumbling" when he arrived. Patient states he feels better now. Admits to ha. Patient denies blurred vision, cp, sob, tingling, numbness, weakness, trauma, neck pain, back pain, abd pain, nvd, dysuria and hematuria.  In ED,  (repeat 231), trops negative, other labs unremarkable, CXR negative. neuro consulted, recommended CTH, CTA H&N, aspirin and plavix. CTH negative. No MRI as pt cannot tolerate 2/2 claustrophobia and d/t shrapnel in neck and chest. pt sent to CDU for workup and monitoring.   In CDU, CTA H&N negative, neuro checks normal, no event son telemetry. A1c 6.4 Lipids WNL, pt feeling well with no complaints, pt to be d/cd with asa 81mg QD and plavix 75mg QD, neurology follow up. Case d/w attending.

## 2018-11-30 NOTE — ED CDU PROVIDER INITIAL DAY NOTE - OBJECTIVE STATEMENT
74 yo M with pmhx diet controlled dm and PTSD presenting with confusion at 1100 AM today. Patient states he was fine when he woke up and then became confused while he was driving. He thought it was secondary to not eating so he went to the diner to eat, but symptoms persisted. Patient states recently ran out of his Klonopin. Patients son and wife noticed him "mumbling" when he arrived. Patient states he feels better now. Admits to ha. Patient denies blurred vision, cp, sob, tingling, numbness, weakness, trauma, neck pain, back pain, abd pain, nvd, dysuria and hematuria

## 2018-11-30 NOTE — ED ADULT NURSE REASSESSMENT NOTE - COMFORT CARE
po fluids offered/ambulated to bathroom/darkened lights/plan of care explained/repositioned/warm blanket provided

## 2018-11-30 NOTE — ED CDU PROVIDER DISPOSITION NOTE - NSFOLLOWUPCLINICS_GEN_ALL_ED_FT
[facility]  Neurology  [address] [place] [address] - [address] [place] [address]  Phone: [phone]  Fax:   Follow Up Time: 4-6 Days

## 2018-11-30 NOTE — ED ADULT NURSE REASSESSMENT NOTE - NS ED NURSE REASSESS COMMENT FT1
Received pt from LEXIE Nunez, received pt alert and responsive, oriented x4, denies any respiratory distress, SOB, or difficulty breathing. Pt transferred to CDU for neuro checks, CTA, result spending, neuro check completed, intact no deficits. SR on telemetry.  IV in place, patent and free of signs of infiltration,   pt denies chest pain or palpitations, V/S stable, pt afebrile, c/o L shoulder pain medicated as ordered.  Pt educated on unit and unit rules, instructed patient to notify RN of any needed assistance, Pt verbalizes understanding, Call bell placed within reach. Safety maintained. Will continue to monitor.

## 2018-12-01 VITALS
OXYGEN SATURATION: 98 % | HEART RATE: 66 BPM | TEMPERATURE: 98 F | SYSTOLIC BLOOD PRESSURE: 136 MMHG | RESPIRATION RATE: 16 BRPM | DIASTOLIC BLOOD PRESSURE: 85 MMHG

## 2018-12-01 LAB
CHOLEST SERPL-MCNC: 152 MG/DL — SIGNIFICANT CHANGE UP (ref 10–199)
CK SERPL-CCNC: 231 U/L — HIGH (ref 30–200)
HBA1C BLD-MCNC: 6.4 % — HIGH (ref 4–5.6)
HDLC SERPL-MCNC: 55 MG/DL — SIGNIFICANT CHANGE UP
LIPID PNL WITH DIRECT LDL SERPL: 81 MG/DL — SIGNIFICANT CHANGE UP
TOTAL CHOLESTEROL/HDL RATIO MEASUREMENT: 2.8 RATIO — LOW (ref 3.4–9.6)
TRIGL SERPL-MCNC: 80 MG/DL — SIGNIFICANT CHANGE UP (ref 10–149)

## 2018-12-01 PROCEDURE — 85610 PROTHROMBIN TIME: CPT

## 2018-12-01 PROCEDURE — 99291 CRITICAL CARE FIRST HOUR: CPT | Mod: 25

## 2018-12-01 PROCEDURE — 80053 COMPREHEN METABOLIC PANEL: CPT

## 2018-12-01 PROCEDURE — 70450 CT HEAD/BRAIN W/O DYE: CPT

## 2018-12-01 PROCEDURE — 99217: CPT

## 2018-12-01 PROCEDURE — 80061 LIPID PANEL: CPT

## 2018-12-01 PROCEDURE — 70498 CT ANGIOGRAPHY NECK: CPT

## 2018-12-01 PROCEDURE — 82550 ASSAY OF CK (CPK): CPT

## 2018-12-01 PROCEDURE — 93005 ELECTROCARDIOGRAM TRACING: CPT

## 2018-12-01 PROCEDURE — 96374 THER/PROPH/DIAG INJ IV PUSH: CPT | Mod: XU

## 2018-12-01 PROCEDURE — 84484 ASSAY OF TROPONIN QUANT: CPT

## 2018-12-01 PROCEDURE — 85027 COMPLETE CBC AUTOMATED: CPT

## 2018-12-01 PROCEDURE — 83036 HEMOGLOBIN GLYCOSYLATED A1C: CPT

## 2018-12-01 PROCEDURE — G0378: CPT

## 2018-12-01 PROCEDURE — 85730 THROMBOPLASTIN TIME PARTIAL: CPT

## 2018-12-01 PROCEDURE — 71045 X-RAY EXAM CHEST 1 VIEW: CPT

## 2018-12-01 PROCEDURE — 82962 GLUCOSE BLOOD TEST: CPT

## 2018-12-01 PROCEDURE — 82553 CREATINE MB FRACTION: CPT

## 2018-12-01 PROCEDURE — 70496 CT ANGIOGRAPHY HEAD: CPT

## 2018-12-01 RX ORDER — CLOPIDOGREL BISULFATE 75 MG/1
1 TABLET, FILM COATED ORAL
Qty: 14 | Refills: 0
Start: 2018-12-01 | End: 2018-12-14

## 2018-12-01 NOTE — ED CDU PROVIDER SUBSEQUENT DAY NOTE - HISTORY
No interval changes since initial CDU provider note. Pt feels well without complaint. NAD, VSS. no events on tele. Neuro exam normal, no deficits, no slurred speech. CTH negative, CTA H&N negative. will continue monitoring. Cari Aldridge

## 2018-12-01 NOTE — ED CDU PROVIDER SUBSEQUENT DAY NOTE - PROGRESS NOTE DETAILS
CDU NOTE JAYESH RIVERA: Pt resting comfortably, feeling well without complaint. NAD, VSS. No events on telemetry. Neuro exam normal, no deficits. will continue monitoring. Pt resting comfortably. NAD. No complaints. VSS. no events on tele, pt with no complaints of blurred vision, changes in sensations, difficulty ambulating or confusion, no neurological defects on examination.  CK down from 279-231. -Karrie Alfaro PA-C I have personally performed a face to face diagnostic evaluation on this patient.  I have reviewed the ACP note and agree with the history, exam, and plan of care, except as noted.        Pt observed overnight for episode of dizziness and mumbling speech. Underwent CT/CTA without e/o ICH or subacute infarct. No tele events. Neurology consultation appreciated, TIA being considered. Patient started on aspirin and plavix for CVA prevention, unable to have MR due to shrapnel present. Patient stable for discharge with outpatient neurology follow up. Spoke with neurology ([id number]), will discuss case with attending prior to d/c. -REUBEN LucasC Spoke with neurology [id number] will be coming to cdu momentarily to see patient. -Karrie Alfaro PA-C Spoke with Dr. Feliciano in regards to pt, he states he does not need to be reevaluated by neurology prior to discharge, recommends asa and plavix, f/u with outpatient neurology. -Karrie Alfaro PA-C

## 2019-01-10 ENCOUNTER — RX RENEWAL (OUTPATIENT)
Age: 74
End: 2019-01-10

## 2019-02-13 ENCOUNTER — INPATIENT (INPATIENT)
Facility: HOSPITAL | Age: 74
LOS: 1 days | Discharge: HOME | DRG: 153 | End: 2019-02-15
Attending: INTERNAL MEDICINE | Admitting: INTERNAL MEDICINE
Payer: MEDICARE

## 2019-02-13 VITALS
WEIGHT: 164.91 LBS | HEIGHT: 65 IN | SYSTOLIC BLOOD PRESSURE: 142 MMHG | HEART RATE: 103 BPM | RESPIRATION RATE: 18 BRPM | TEMPERATURE: 100 F | OXYGEN SATURATION: 96 % | DIASTOLIC BLOOD PRESSURE: 70 MMHG

## 2019-02-13 DIAGNOSIS — E83.39 OTHER DISORDERS OF PHOSPHORUS METABOLISM: ICD-10-CM

## 2019-02-13 DIAGNOSIS — R73.03 PREDIABETES: ICD-10-CM

## 2019-02-13 DIAGNOSIS — E86.0 DEHYDRATION: ICD-10-CM

## 2019-02-13 DIAGNOSIS — A41.9 SEPSIS, UNSPECIFIED ORGANISM: ICD-10-CM

## 2019-02-13 DIAGNOSIS — J06.9 ACUTE UPPER RESPIRATORY INFECTION, UNSPECIFIED: ICD-10-CM

## 2019-02-13 DIAGNOSIS — F43.10 POST-TRAUMATIC STRESS DISORDER, UNSPECIFIED: ICD-10-CM

## 2019-02-13 LAB
ALBUMIN SERPL ELPH-MCNC: 4.3 G/DL — SIGNIFICANT CHANGE UP (ref 3.3–5)
ALP SERPL-CCNC: 62 U/L — SIGNIFICANT CHANGE UP (ref 40–120)
ALT FLD-CCNC: 18 U/L — SIGNIFICANT CHANGE UP (ref 10–45)
ANION GAP SERPL CALC-SCNC: 13 MMOL/L — SIGNIFICANT CHANGE UP (ref 5–17)
APPEARANCE UR: ABNORMAL
APTT BLD: 31.5 SEC — SIGNIFICANT CHANGE UP (ref 27.5–36.3)
AST SERPL-CCNC: 20 U/L — SIGNIFICANT CHANGE UP (ref 10–40)
BACTERIA # UR AUTO: NEGATIVE — SIGNIFICANT CHANGE UP
BASOPHILS # BLD AUTO: 0 K/UL — SIGNIFICANT CHANGE UP (ref 0–0.2)
BASOPHILS NFR BLD AUTO: 0.2 % — SIGNIFICANT CHANGE UP (ref 0–2)
BILIRUB SERPL-MCNC: 0.4 MG/DL — SIGNIFICANT CHANGE UP (ref 0.2–1.2)
BILIRUB UR-MCNC: NEGATIVE — SIGNIFICANT CHANGE UP
BUN SERPL-MCNC: 19 MG/DL — SIGNIFICANT CHANGE UP (ref 7–23)
CALCIUM SERPL-MCNC: 9.1 MG/DL — SIGNIFICANT CHANGE UP (ref 8.4–10.5)
CHLORIDE SERPL-SCNC: 96 MMOL/L — SIGNIFICANT CHANGE UP (ref 96–108)
CO2 SERPL-SCNC: 22 MMOL/L — SIGNIFICANT CHANGE UP (ref 22–31)
COLOR SPEC: YELLOW — SIGNIFICANT CHANGE UP
CREAT SERPL-MCNC: 0.96 MG/DL — SIGNIFICANT CHANGE UP (ref 0.5–1.3)
DIFF PNL FLD: NEGATIVE — SIGNIFICANT CHANGE UP
EOSINOPHIL # BLD AUTO: 0 K/UL — SIGNIFICANT CHANGE UP (ref 0–0.5)
EOSINOPHIL NFR BLD AUTO: 0.4 % — SIGNIFICANT CHANGE UP (ref 0–6)
EPI CELLS # UR: 0 /HPF — SIGNIFICANT CHANGE UP
GAS PNL BLDV: SIGNIFICANT CHANGE UP
GLUCOSE SERPL-MCNC: 110 MG/DL — HIGH (ref 70–99)
GLUCOSE UR QL: NEGATIVE — SIGNIFICANT CHANGE UP
HADV DNA SPEC QL NAA+PROBE: DETECTED
HCT VFR BLD CALC: 43.3 % — SIGNIFICANT CHANGE UP (ref 39–50)
HGB BLD-MCNC: 14.8 G/DL — SIGNIFICANT CHANGE UP (ref 13–17)
HYALINE CASTS # UR AUTO: 0 /LPF — SIGNIFICANT CHANGE UP (ref 0–2)
INR BLD: 1.14 RATIO — SIGNIFICANT CHANGE UP (ref 0.88–1.16)
KETONES UR-MCNC: NEGATIVE — SIGNIFICANT CHANGE UP
LEUKOCYTE ESTERASE UR-ACNC: NEGATIVE — SIGNIFICANT CHANGE UP
LYMPHOCYTES # BLD AUTO: 0.8 K/UL — LOW (ref 1–3.3)
LYMPHOCYTES # BLD AUTO: 9.2 % — LOW (ref 13–44)
MAGNESIUM SERPL-MCNC: 2.1 MG/DL — SIGNIFICANT CHANGE UP (ref 1.6–2.6)
MCHC RBC-ENTMCNC: 29.9 PG — SIGNIFICANT CHANGE UP (ref 27–34)
MCHC RBC-ENTMCNC: 34.2 GM/DL — SIGNIFICANT CHANGE UP (ref 32–36)
MCV RBC AUTO: 87.4 FL — SIGNIFICANT CHANGE UP (ref 80–100)
MONOCYTES # BLD AUTO: 1.1 K/UL — HIGH (ref 0–0.9)
MONOCYTES NFR BLD AUTO: 11.8 % — SIGNIFICANT CHANGE UP (ref 2–14)
NEUTROPHILS # BLD AUTO: 7.2 K/UL — SIGNIFICANT CHANGE UP (ref 1.8–7.4)
NEUTROPHILS NFR BLD AUTO: 78.4 % — HIGH (ref 43–77)
NITRITE UR-MCNC: NEGATIVE — SIGNIFICANT CHANGE UP
PH UR: 7 — SIGNIFICANT CHANGE UP (ref 5–8)
PHOSPHATE SERPL-MCNC: 2.2 MG/DL — LOW (ref 2.5–4.5)
PLATELET # BLD AUTO: 248 K/UL — SIGNIFICANT CHANGE UP (ref 150–400)
POTASSIUM SERPL-MCNC: 4.4 MMOL/L — SIGNIFICANT CHANGE UP (ref 3.5–5.3)
POTASSIUM SERPL-SCNC: 4.4 MMOL/L — SIGNIFICANT CHANGE UP (ref 3.5–5.3)
PROT SERPL-MCNC: 7.7 G/DL — SIGNIFICANT CHANGE UP (ref 6–8.3)
PROT UR-MCNC: ABNORMAL
PROTHROM AB SERPL-ACNC: 13 SEC — HIGH (ref 10–12.9)
RAPID RVP RESULT: DETECTED
RBC # BLD: 4.96 M/UL — SIGNIFICANT CHANGE UP (ref 4.2–5.8)
RBC # FLD: 12 % — SIGNIFICANT CHANGE UP (ref 10.3–14.5)
RBC CASTS # UR COMP ASSIST: 1 /HPF — SIGNIFICANT CHANGE UP (ref 0–4)
SODIUM SERPL-SCNC: 131 MMOL/L — LOW (ref 135–145)
SP GR SPEC: 1.03 — HIGH (ref 1.01–1.02)
TROPONIN T, HIGH SENSITIVITY RESULT: 9 NG/L — SIGNIFICANT CHANGE UP (ref 0–51)
UROBILINOGEN FLD QL: NEGATIVE — SIGNIFICANT CHANGE UP
WBC # BLD: 9.2 K/UL — SIGNIFICANT CHANGE UP (ref 3.8–10.5)
WBC # FLD AUTO: 9.2 K/UL — SIGNIFICANT CHANGE UP (ref 3.8–10.5)
WBC UR QL: 2 /HPF — SIGNIFICANT CHANGE UP (ref 0–5)

## 2019-02-13 PROCEDURE — 99285 EMERGENCY DEPT VISIT HI MDM: CPT | Mod: GC,25

## 2019-02-13 PROCEDURE — 70486 CT MAXILLOFACIAL W/O DYE: CPT | Mod: 26

## 2019-02-13 PROCEDURE — 70450 CT HEAD/BRAIN W/O DYE: CPT | Mod: 26

## 2019-02-13 PROCEDURE — 93010 ELECTROCARDIOGRAM REPORT: CPT | Mod: GC

## 2019-02-13 PROCEDURE — 99223 1ST HOSP IP/OBS HIGH 75: CPT

## 2019-02-13 PROCEDURE — 71045 X-RAY EXAM CHEST 1 VIEW: CPT | Mod: 26

## 2019-02-13 RX ORDER — INSULIN LISPRO 100/ML
VIAL (ML) SUBCUTANEOUS AT BEDTIME
Qty: 0 | Refills: 0 | Status: DISCONTINUED | OUTPATIENT
Start: 2019-02-13 | End: 2019-02-15

## 2019-02-13 RX ORDER — TAMSULOSIN HYDROCHLORIDE 0.4 MG/1
0.4 CAPSULE ORAL AT BEDTIME
Qty: 0 | Refills: 0 | Status: DISCONTINUED | OUTPATIENT
Start: 2019-02-13 | End: 2019-02-15

## 2019-02-13 RX ORDER — CLONAZEPAM 1 MG
2 TABLET ORAL AT BEDTIME
Qty: 0 | Refills: 0 | Status: DISCONTINUED | OUTPATIENT
Start: 2019-02-13 | End: 2019-02-15

## 2019-02-13 RX ORDER — SODIUM CHLORIDE 9 MG/ML
1000 INJECTION INTRAMUSCULAR; INTRAVENOUS; SUBCUTANEOUS ONCE
Qty: 0 | Refills: 0 | Status: COMPLETED | OUTPATIENT
Start: 2019-02-13 | End: 2019-02-13

## 2019-02-13 RX ORDER — CLOPIDOGREL BISULFATE 75 MG/1
1 TABLET, FILM COATED ORAL
Qty: 0 | Refills: 0 | COMMUNITY

## 2019-02-13 RX ORDER — OMEPRAZOLE 10 MG/1
0 CAPSULE, DELAYED RELEASE ORAL
Qty: 180 | Refills: 0 | COMMUNITY

## 2019-02-13 RX ORDER — BECLOMETHASONE DIPROPIONATE 42 MCG
0 AEROSOL, SPRAY (GRAM) NASAL
Qty: 10.6 | Refills: 0 | COMMUNITY

## 2019-02-13 RX ORDER — SODIUM CHLORIDE 9 MG/ML
1500 INJECTION INTRAMUSCULAR; INTRAVENOUS; SUBCUTANEOUS ONCE
Qty: 0 | Refills: 0 | Status: COMPLETED | OUTPATIENT
Start: 2019-02-13 | End: 2019-02-13

## 2019-02-13 RX ORDER — FINASTERIDE 5 MG/1
5 TABLET, FILM COATED ORAL AT BEDTIME
Qty: 0 | Refills: 0 | Status: DISCONTINUED | OUTPATIENT
Start: 2019-02-13 | End: 2019-02-15

## 2019-02-13 RX ORDER — BUPROPION HYDROCHLORIDE 150 MG/1
300 TABLET, EXTENDED RELEASE ORAL DAILY
Qty: 0 | Refills: 0 | Status: DISCONTINUED | OUTPATIENT
Start: 2019-02-13 | End: 2019-02-15

## 2019-02-13 RX ORDER — INSULIN LISPRO 100/ML
VIAL (ML) SUBCUTANEOUS
Qty: 0 | Refills: 0 | Status: DISCONTINUED | OUTPATIENT
Start: 2019-02-13 | End: 2019-02-15

## 2019-02-13 RX ORDER — VENLAFAXINE HCL 75 MG
37.5 CAPSULE, EXT RELEASE 24 HR ORAL DAILY
Qty: 0 | Refills: 0 | Status: DISCONTINUED | OUTPATIENT
Start: 2019-02-13 | End: 2019-02-15

## 2019-02-13 RX ORDER — CLOPIDOGREL BISULFATE 75 MG/1
0 TABLET, FILM COATED ORAL
Qty: 14 | Refills: 0 | COMMUNITY

## 2019-02-13 RX ORDER — CLOPIDOGREL BISULFATE 75 MG/1
75 TABLET, FILM COATED ORAL DAILY
Qty: 0 | Refills: 0 | Status: DISCONTINUED | OUTPATIENT
Start: 2019-02-13 | End: 2019-02-15

## 2019-02-13 RX ORDER — SODIUM CHLORIDE 0.65 %
1 AEROSOL, SPRAY (ML) NASAL THREE TIMES A DAY
Qty: 0 | Refills: 0 | Status: DISCONTINUED | OUTPATIENT
Start: 2019-02-13 | End: 2019-02-15

## 2019-02-13 RX ORDER — HEPARIN SODIUM 5000 [USP'U]/ML
5000 INJECTION INTRAVENOUS; SUBCUTANEOUS EVERY 12 HOURS
Qty: 0 | Refills: 0 | Status: DISCONTINUED | OUTPATIENT
Start: 2019-02-13 | End: 2019-02-15

## 2019-02-13 RX ORDER — SODIUM CHLORIDE 9 MG/ML
1000 INJECTION INTRAMUSCULAR; INTRAVENOUS; SUBCUTANEOUS
Qty: 0 | Refills: 0 | Status: DISCONTINUED | OUTPATIENT
Start: 2019-02-13 | End: 2019-02-15

## 2019-02-13 RX ORDER — ACETAMINOPHEN 500 MG
650 TABLET ORAL EVERY 6 HOURS
Qty: 0 | Refills: 0 | Status: DISCONTINUED | OUTPATIENT
Start: 2019-02-13 | End: 2019-02-14

## 2019-02-13 RX ORDER — CEFTRIAXONE 500 MG/1
1 INJECTION, POWDER, FOR SOLUTION INTRAMUSCULAR; INTRAVENOUS ONCE
Qty: 0 | Refills: 0 | Status: COMPLETED | OUTPATIENT
Start: 2019-02-13 | End: 2019-02-13

## 2019-02-13 RX ORDER — ACETAMINOPHEN 500 MG
650 TABLET ORAL ONCE
Qty: 0 | Refills: 0 | Status: COMPLETED | OUTPATIENT
Start: 2019-02-13 | End: 2019-02-13

## 2019-02-13 RX ADMIN — SODIUM CHLORIDE 2000 MILLILITER(S): 9 INJECTION INTRAMUSCULAR; INTRAVENOUS; SUBCUTANEOUS at 17:35

## 2019-02-13 RX ADMIN — Medication 650 MILLIGRAM(S): at 20:19

## 2019-02-13 RX ADMIN — Medication 650 MILLIGRAM(S): at 19:49

## 2019-02-13 RX ADMIN — SODIUM CHLORIDE 1500 MILLILITER(S): 9 INJECTION INTRAMUSCULAR; INTRAVENOUS; SUBCUTANEOUS at 19:49

## 2019-02-13 RX ADMIN — CEFTRIAXONE 100 GRAM(S): 500 INJECTION, POWDER, FOR SOLUTION INTRAMUSCULAR; INTRAVENOUS at 19:50

## 2019-02-13 RX ADMIN — Medication 62.5 MILLIMOLE(S): at 23:00
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
## 2019-02-13 NOTE — H&P ADULT - PROBLEM SELECTOR PLAN 2
Supportive care  Tylenol PRN for fevers  Robitussin for any cough (Pt coughed several times during exam despite denying on ROS)  Treat dehydration Noted with hypophosphatemia on labwork, suspect poor po intake given concurrent dehydration.  Supplement via IV with sodium phosphate

## 2019-02-13 NOTE — ED ADULT NURSE NOTE - NSIMPLEMENTINTERV_GEN_ALL_ED
Implemented All Fall Risk Interventions:  [place] to call system. Call bell, personal items and telephone within reach. Instruct patient to call for assistance. Room bathroom lighting operational. Non-slip footwear when patient is off stretcher. Physically safe environment: no spills, clutter or unnecessary equipment. Stretcher in lowest position, wheels locked, appropriate side rails in place. Provide visual cue, wrist band, yellow gown, etc. Monitor gait and stability. Monitor for mental status changes and reorient to person, place, and time. Review medications for side effects contributing to fall risk. Reinforce activity limits and safety measures with patient and family.

## 2019-02-13 NOTE — ED PROVIDER NOTE - PHYSICAL EXAMINATION
Physical Exam:   GEN: elderly male who is in no acute distress, AAOx2 to person and place but not to time  HENT: NCAT, MMM, no stridor  EYES: PERRLA, EOMI  RESP: CTAB, no respiratory distress  CV: mild tachycardia and regular rhythm, S1 S2  ABD: soft and NTND  EXT: No edema, No bony deformity of extremities  SKIN: No skin breaks, skin color normal for race  NEURO: CN III-XII intact, No focal motor or sensory deficits in all 4 extremities. However he does have dysmetria on the left arm   ~ Barry Guido MD

## 2019-02-13 NOTE — ED PROVIDER NOTE - PROGRESS NOTE DETAILS
Barry Guido MD: endorse to Dr. Christiano Pace.  to call Neuro Dr. Basilio for consult. signed out to incoming team for pending results
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
## 2019-02-13 NOTE — H&P ADULT - NSHPLABSRESULTS_GEN_ALL_CORE
Labs personally reviewed:                        14.8   9.2   )-----------( 248      ( 2019 17:40 )             43.3       02-    131<L>  |  96  |  19  ----------------------------<  110<H>  4.4   |  22  |  0.96    Ca    9.1      2019 17:40  Phos  2.2     02-  Mg     2.1     -    TPro  7.7  /  Alb  4.3  /  TBili  0.4  /  DBili  x   /  AST  20  /  ALT  18  /  AlkPhos  62  02-            LIVER FUNCTIONS - ( 2019 17:40 )  Alb: 4.3 g/dL / Pro: 7.7 g/dL / ALK PHOS: 62 U/L / ALT: 18 U/L / AST: 20 U/L / GGT: x             PT/INR - ( 2019 17:40 )   PT: 13.0 sec;   INR: 1.14 ratio         PTT - ( 2019 17:40 )  PTT:31.5 sec    Urinalysis Basic - ( 2019 19:02 )    Color: Yellow / Appearance: Slightly Turbid / S.028 / pH: x  Gluc: x / Ketone: Negative  / Bili: Negative / Urobili: Negative   Blood: x / Protein: 30 mg/dL / Nitrite: Negative   Leuk Esterase: Negative / RBC: 1 /hpf / WBC 2 /HPF   Sq Epi: x / Non Sq Epi: 0 /hpf / Bacteria: Negative    RVP + Adenovirus    CT head and maxillofacial with baseline mod left maxillary sinus thickening, no acute findings  CXR grossly clear lungs personally reviewed    EKG reviewed

## 2019-02-13 NOTE — H&P ADULT - NSHPPHYSICALEXAM_GEN_ALL_CORE
Vital Signs Last 24 Hrs  T(C): 36.9 (13 Feb 2019 23:00), Max: 39.4 (13 Feb 2019 18:34)  T(F): 98.5 (13 Feb 2019 23:00), Max: 103 (13 Feb 2019 18:34)  HR: 72 (13 Feb 2019 23:00) (72 - 103)  BP: 115/66 (13 Feb 2019 23:00) (115/66 - 142/70)  BP(mean): --  RR: 16 (13 Feb 2019 23:00) (16 - 18)  SpO2: 95% (13 Feb 2019 23:00) (95% - 96%)    GENERAL: No acute distress, well-developed  HEAD:  Atraumatic, Normocephalic  EYES: EOMI, PERRLA, conjunctiva and sclera clear  ENT: Oral mucosa moist  NECK: Neck supple  CHEST/LUNG: Clear to auscultation bilaterally; No wheeze, no rales, no rhonchi.    HEART: Regular rate and rhythm; No murmurs, rubs, or gallops  ABDOMEN: Soft, Nontender, Nondistended; Bowel sounds present  EXTREMITIES:  No clubbing, cyanosis, or edema  VASCULAR: Posterior tibialis pulses intact bilaterally  PSYCH: Normal behavior, normal affect  NEUROLOGY: AAOx3  SKIN: grossly warm and dry

## 2019-02-13 NOTE — H&P ADULT - PROBLEM SELECTOR PLAN 1
Weakness likely related to URI and dehydration given elevated urine specific gravity  s/p IV fluid boluses in ED with improvement in symptoms  Continue on IV fluid support with NS @ 75cc/hr for now, encouraged po intake as tolerated  Fall precautions given weakness and difficulty ambulating

## 2019-02-13 NOTE — H&P ADULT - PROBLEM SELECTOR PLAN 3
Continue venlafaxine and wellbutrin  Klonopin qhs PRN  Patient thinks he may also be on another medication but cannot recall at this time, in AM may need to clarify missing medication if any. Continue venlafaxine and wellbutrin  Klonopin qhs PRN ISTOP REF# [id number]	  Patient thinks he may also be on another medication but cannot recall at this time, in AM may need to clarify missing medication if any. Supportive care  Tylenol PRN for fevers  Robitussin for any cough (Pt coughed several times during exam despite denying on ROS)  Treat dehydration

## 2019-02-13 NOTE — H&P ADULT - ASSESSMENT
72yo M with borderline diabetes, PTSD on antidepressants and benzodiazepine, BPH, who now presents with lightheadedness and weakness progressing over the last 2-3 days. Found to have adenovirus infection w/fever and likely dehydration based on UA specific gravity.

## 2019-02-13 NOTE — H&P ADULT - PROBLEM SELECTOR PLAN 4
Can check A1c with am labs  Consistent carb diet for now  ISS Continue venlafaxine and wellbutrin  Klonopin qhs PRN ISTOP REF# [id number]	  Patient thinks he may also be on another medication but cannot recall at this time, in AM may need to clarify missing medication if any.

## 2019-02-13 NOTE — ED PROVIDER NOTE - NS ED ROS FT
CONST: + fevers, + chills  EYES: no redness, no vision changes   HENT: + sinus congestion  CV: no chest pain, no palpitations     RESP: no shortness of breath, no cough  ABD: no abdominal pain, no vomiting     : no dysuria, no hematuria   MSK: no muscle pain, no joint swelling     NEURO: + off balance, no focal weakness or loss of sensation     SKIN:  no rash, no lacerations.   ~ Barry Guido MD
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
## 2019-02-13 NOTE — ED PROVIDER NOTE - CARE PLAN
Principal Discharge DX:	Sepsis  Secondary Diagnosis:	Encephalopathy acute  Secondary Diagnosis:	Disorientation

## 2019-02-14 DIAGNOSIS — R41.82 ALTERED MENTAL STATUS, UNSPECIFIED: ICD-10-CM

## 2019-02-14 DIAGNOSIS — G93.40 ENCEPHALOPATHY, UNSPECIFIED: ICD-10-CM

## 2019-02-14 DIAGNOSIS — E11.9 TYPE 2 DIABETES MELLITUS WITHOUT COMPLICATIONS: ICD-10-CM

## 2019-02-14 DIAGNOSIS — B34.0 ADENOVIRUS INFECTION, UNSPECIFIED: ICD-10-CM

## 2019-02-14 LAB
ANION GAP SERPL CALC-SCNC: 11 MMOL/L — SIGNIFICANT CHANGE UP (ref 5–17)
BASOPHILS # BLD AUTO: 0.01 K/UL — SIGNIFICANT CHANGE UP (ref 0–0.2)
BASOPHILS NFR BLD AUTO: 0.1 % — SIGNIFICANT CHANGE UP (ref 0–2)
BUN SERPL-MCNC: 14 MG/DL — SIGNIFICANT CHANGE UP (ref 7–23)
CALCIUM SERPL-MCNC: 8.5 MG/DL — SIGNIFICANT CHANGE UP (ref 8.4–10.5)
CHLORIDE SERPL-SCNC: 100 MMOL/L — SIGNIFICANT CHANGE UP (ref 96–108)
CO2 SERPL-SCNC: 23 MMOL/L — SIGNIFICANT CHANGE UP (ref 22–31)
CREAT SERPL-MCNC: 0.96 MG/DL — SIGNIFICANT CHANGE UP (ref 0.5–1.3)
EOSINOPHIL # BLD AUTO: 0.04 K/UL — SIGNIFICANT CHANGE UP (ref 0–0.5)
EOSINOPHIL NFR BLD AUTO: 0.5 % — SIGNIFICANT CHANGE UP (ref 0–6)
GLUCOSE BLDC GLUCOMTR-MCNC: 112 MG/DL — HIGH (ref 70–99)
GLUCOSE BLDC GLUCOMTR-MCNC: 116 MG/DL — HIGH (ref 70–99)
GLUCOSE BLDC GLUCOMTR-MCNC: 124 MG/DL — HIGH (ref 70–99)
GLUCOSE BLDC GLUCOMTR-MCNC: 132 MG/DL — HIGH (ref 70–99)
GLUCOSE BLDC GLUCOMTR-MCNC: 145 MG/DL — HIGH (ref 70–99)
GLUCOSE SERPL-MCNC: 149 MG/DL — HIGH (ref 70–99)
HCT VFR BLD CALC: 40.4 % — SIGNIFICANT CHANGE UP (ref 39–50)
HCV AB S/CO SERPL IA: 0.06 S/CO — SIGNIFICANT CHANGE UP
HCV AB SERPL-IMP: SIGNIFICANT CHANGE UP
HGB BLD-MCNC: 13.4 G/DL — SIGNIFICANT CHANGE UP (ref 13–17)
IMM GRANULOCYTES NFR BLD AUTO: 0.1 % — SIGNIFICANT CHANGE UP (ref 0–1.5)
LYMPHOCYTES # BLD AUTO: 0.51 K/UL — LOW (ref 1–3.3)
LYMPHOCYTES # BLD AUTO: 6.8 % — LOW (ref 13–44)
MAGNESIUM SERPL-MCNC: 2 MG/DL — SIGNIFICANT CHANGE UP (ref 1.6–2.6)
MCHC RBC-ENTMCNC: 29.6 PG — SIGNIFICANT CHANGE UP (ref 27–34)
MCHC RBC-ENTMCNC: 33.2 GM/DL — SIGNIFICANT CHANGE UP (ref 32–36)
MCV RBC AUTO: 89.2 FL — SIGNIFICANT CHANGE UP (ref 80–100)
MONOCYTES # BLD AUTO: 1.31 K/UL — HIGH (ref 0–0.9)
MONOCYTES NFR BLD AUTO: 17.4 % — HIGH (ref 2–14)
NEUTROPHILS # BLD AUTO: 5.64 K/UL — SIGNIFICANT CHANGE UP (ref 1.8–7.4)
NEUTROPHILS NFR BLD AUTO: 75.1 % — SIGNIFICANT CHANGE UP (ref 43–77)
PHOSPHATE SERPL-MCNC: 2.3 MG/DL — LOW (ref 2.5–4.5)
PLATELET # BLD AUTO: 237 K/UL — SIGNIFICANT CHANGE UP (ref 150–400)
POTASSIUM SERPL-MCNC: 4 MMOL/L — SIGNIFICANT CHANGE UP (ref 3.5–5.3)
POTASSIUM SERPL-SCNC: 4 MMOL/L — SIGNIFICANT CHANGE UP (ref 3.5–5.3)
RBC # BLD: 4.53 M/UL — SIGNIFICANT CHANGE UP (ref 4.2–5.8)
RBC # FLD: 12.9 % — SIGNIFICANT CHANGE UP (ref 10.3–14.5)
SODIUM SERPL-SCNC: 134 MMOL/L — LOW (ref 135–145)
WBC # BLD: 7.52 K/UL — SIGNIFICANT CHANGE UP (ref 3.8–10.5)
WBC # FLD AUTO: 7.52 K/UL — SIGNIFICANT CHANGE UP (ref 3.8–10.5)

## 2019-02-14 RX ORDER — ACETAMINOPHEN 500 MG
650 TABLET ORAL ONCE
Qty: 0 | Refills: 0 | Status: COMPLETED | OUTPATIENT
Start: 2019-02-14 | End: 2019-02-14

## 2019-02-14 RX ADMIN — SODIUM CHLORIDE 75 MILLILITER(S): 9 INJECTION INTRAMUSCULAR; INTRAVENOUS; SUBCUTANEOUS at 12:38

## 2019-02-14 RX ADMIN — HEPARIN SODIUM 5000 UNIT(S): 5000 INJECTION INTRAVENOUS; SUBCUTANEOUS at 05:03

## 2019-02-14 RX ADMIN — Medication 2 MILLIGRAM(S): at 22:47

## 2019-02-14 RX ADMIN — Medication 1 SPRAY(S): at 05:03

## 2019-02-14 RX ADMIN — Medication 650 MILLIGRAM(S): at 18:37

## 2019-02-14 RX ADMIN — Medication 62.5 MILLIMOLE(S): at 18:35

## 2019-02-14 RX ADMIN — Medication 37.5 MILLIGRAM(S): at 12:39

## 2019-02-14 RX ADMIN — HEPARIN SODIUM 5000 UNIT(S): 5000 INJECTION INTRAVENOUS; SUBCUTANEOUS at 17:50

## 2019-02-14 RX ADMIN — BUPROPION HYDROCHLORIDE 300 MILLIGRAM(S): 150 TABLET, EXTENDED RELEASE ORAL at 13:23

## 2019-02-14 RX ADMIN — Medication 650 MILLIGRAM(S): at 05:03

## 2019-02-14 RX ADMIN — FINASTERIDE 5 MILLIGRAM(S): 5 TABLET, FILM COATED ORAL at 22:35

## 2019-02-14 RX ADMIN — TAMSULOSIN HYDROCHLORIDE 0.4 MILLIGRAM(S): 0.4 CAPSULE ORAL at 22:35

## 2019-02-14 RX ADMIN — Medication 650 MILLIGRAM(S): at 06:46

## 2019-02-14 NOTE — CONSULT NOTE ADULT - ASSESSMENT
74yo M with borderline diabetes, PTSD on antidepressants and benzodiazepine, BPH, who now presents with lightheadedness and weakness progressing over the last 2-3 days.  Neurological exam non-focal.  CTH negative.    Plan:  - no further inpatient neurological workup indicated  - supportive care per primary team
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
## 2019-02-14 NOTE — PROGRESS NOTE ADULT - ASSESSMENT
< from: CT Head No Cont (02.13.19 @ 18:42) >  HEAD:    There is no acute intracranial hemorrhage or mass effect. There are   chronic white matter microvascular ischemic changes. The ventricles and   sulci are normal in size.    There is no displaced calvarial fracture.     MAXILLOFACIAL:    Chronic bilateral nasal bone and bony nasal septal fractures are   unchanged compared to the prior exam. No acute facial bone fractures are   visualized.    A small left maxillary sinus with moderate mucosal thickening is   unchanged compared to the prior study. Mild mucosal thickening involves   the remainder of the paranasal sinuses without associated air-fluid   levels, similar in overall extent compared to the prior study. Nasal   septal deviation and left-sided nasal septal spurring are unchanged. An   incisive canal cyst is stable in size.    The temporomandibular joints are intact. No septal hematoma is seen.  Redemonstrated metallic density (7, 17) in the right masseter muscle. A   calcification is noted in the right masseter muscle, unchanged.    The globes are symmetric in size and contour. Extraocular muscles are not   enlarged or deviated. No radiopaque orbital foreign bodies are visualized   within the globe. The retrobulbar fat is preserved.    IMPRESSION:    Head CT: No acute intracranial abnormality is noted.    Maxillofacial CT: The moderate mucosal thickening in the left maxillary   sinus is stable compared to the 11/30/2018 examination.        < end of copied text >

## 2019-02-15 ENCOUNTER — TRANSCRIPTION ENCOUNTER (OUTPATIENT)
Age: 74
End: 2019-02-15

## 2019-02-15 VITALS
SYSTOLIC BLOOD PRESSURE: 120 MMHG | RESPIRATION RATE: 18 BRPM | OXYGEN SATURATION: 93 % | DIASTOLIC BLOOD PRESSURE: 69 MMHG | TEMPERATURE: 98 F | HEART RATE: 94 BPM

## 2019-02-15 LAB
ANION GAP SERPL CALC-SCNC: 15 MMOL/L — SIGNIFICANT CHANGE UP (ref 5–17)
BUN SERPL-MCNC: 15 MG/DL — SIGNIFICANT CHANGE UP (ref 7–23)
CALCIUM SERPL-MCNC: 8.5 MG/DL — SIGNIFICANT CHANGE UP (ref 8.4–10.5)
CHLORIDE SERPL-SCNC: 97 MMOL/L — SIGNIFICANT CHANGE UP (ref 96–108)
CO2 SERPL-SCNC: 22 MMOL/L — SIGNIFICANT CHANGE UP (ref 22–31)
CREAT SERPL-MCNC: 1.02 MG/DL — SIGNIFICANT CHANGE UP (ref 0.5–1.3)
CULTURE RESULTS: SIGNIFICANT CHANGE UP
GLUCOSE BLDC GLUCOMTR-MCNC: 104 MG/DL — HIGH (ref 70–99)
GLUCOSE BLDC GLUCOMTR-MCNC: 114 MG/DL — HIGH (ref 70–99)
GLUCOSE SERPL-MCNC: 109 MG/DL — HIGH (ref 70–99)
HCT VFR BLD CALC: 41.1 % — SIGNIFICANT CHANGE UP (ref 39–50)
HGB BLD-MCNC: 14.3 G/DL — SIGNIFICANT CHANGE UP (ref 13–17)
MCHC RBC-ENTMCNC: 31 PG — SIGNIFICANT CHANGE UP (ref 27–34)
MCHC RBC-ENTMCNC: 34.9 GM/DL — SIGNIFICANT CHANGE UP (ref 32–36)
MCV RBC AUTO: 88.8 FL — SIGNIFICANT CHANGE UP (ref 80–100)
PHOSPHATE SERPL-MCNC: 2 MG/DL — LOW (ref 2.5–4.5)
PLATELET # BLD AUTO: 205 K/UL — SIGNIFICANT CHANGE UP (ref 150–400)
POTASSIUM SERPL-MCNC: 4.1 MMOL/L — SIGNIFICANT CHANGE UP (ref 3.5–5.3)
POTASSIUM SERPL-SCNC: 4.1 MMOL/L — SIGNIFICANT CHANGE UP (ref 3.5–5.3)
RBC # BLD: 4.63 M/UL — SIGNIFICANT CHANGE UP (ref 4.2–5.8)
RBC # FLD: 12 % — SIGNIFICANT CHANGE UP (ref 10.3–14.5)
SODIUM SERPL-SCNC: 134 MMOL/L — LOW (ref 135–145)
SPECIMEN SOURCE: SIGNIFICANT CHANGE UP
WBC # BLD: 7.4 K/UL — SIGNIFICANT CHANGE UP (ref 3.8–10.5)
WBC # FLD AUTO: 7.4 K/UL — SIGNIFICANT CHANGE UP (ref 3.8–10.5)

## 2019-02-15 PROCEDURE — 87798 DETECT AGENT NOS DNA AMP: CPT

## 2019-02-15 PROCEDURE — 82947 ASSAY GLUCOSE BLOOD QUANT: CPT

## 2019-02-15 PROCEDURE — 71045 X-RAY EXAM CHEST 1 VIEW: CPT

## 2019-02-15 PROCEDURE — 93005 ELECTROCARDIOGRAM TRACING: CPT

## 2019-02-15 PROCEDURE — 80053 COMPREHEN METABOLIC PANEL: CPT

## 2019-02-15 PROCEDURE — 87086 URINE CULTURE/COLONY COUNT: CPT

## 2019-02-15 PROCEDURE — 82435 ASSAY OF BLOOD CHLORIDE: CPT

## 2019-02-15 PROCEDURE — 85014 HEMATOCRIT: CPT

## 2019-02-15 PROCEDURE — 71046 X-RAY EXAM CHEST 2 VIEWS: CPT

## 2019-02-15 PROCEDURE — 83605 ASSAY OF LACTIC ACID: CPT

## 2019-02-15 PROCEDURE — 84100 ASSAY OF PHOSPHORUS: CPT

## 2019-02-15 PROCEDURE — 84132 ASSAY OF SERUM POTASSIUM: CPT

## 2019-02-15 PROCEDURE — 87486 CHLMYD PNEUM DNA AMP PROBE: CPT

## 2019-02-15 PROCEDURE — 82803 BLOOD GASES ANY COMBINATION: CPT

## 2019-02-15 PROCEDURE — 83735 ASSAY OF MAGNESIUM: CPT

## 2019-02-15 PROCEDURE — 82962 GLUCOSE BLOOD TEST: CPT

## 2019-02-15 PROCEDURE — 80048 BASIC METABOLIC PNL TOTAL CA: CPT

## 2019-02-15 PROCEDURE — 87581 M.PNEUMON DNA AMP PROBE: CPT

## 2019-02-15 PROCEDURE — 70486 CT MAXILLOFACIAL W/O DYE: CPT

## 2019-02-15 PROCEDURE — 86803 HEPATITIS C AB TEST: CPT

## 2019-02-15 PROCEDURE — 84484 ASSAY OF TROPONIN QUANT: CPT

## 2019-02-15 PROCEDURE — 82330 ASSAY OF CALCIUM: CPT

## 2019-02-15 PROCEDURE — 71046 X-RAY EXAM CHEST 2 VIEWS: CPT | Mod: 26

## 2019-02-15 PROCEDURE — 81001 URINALYSIS AUTO W/SCOPE: CPT

## 2019-02-15 PROCEDURE — 84295 ASSAY OF SERUM SODIUM: CPT

## 2019-02-15 PROCEDURE — 85610 PROTHROMBIN TIME: CPT

## 2019-02-15 PROCEDURE — 87040 BLOOD CULTURE FOR BACTERIA: CPT

## 2019-02-15 PROCEDURE — 99285 EMERGENCY DEPT VISIT HI MDM: CPT

## 2019-02-15 PROCEDURE — 85730 THROMBOPLASTIN TIME PARTIAL: CPT

## 2019-02-15 PROCEDURE — 87633 RESP VIRUS 12-25 TARGETS: CPT

## 2019-02-15 PROCEDURE — 85027 COMPLETE CBC AUTOMATED: CPT

## 2019-02-15 PROCEDURE — 70450 CT HEAD/BRAIN W/O DYE: CPT

## 2019-02-15 RX ORDER — BECLOMETHASONE DIPROPIONATE 42 MCG
1 AEROSOL, SPRAY (GRAM) NASAL
Qty: 0 | Refills: 0 | COMMUNITY

## 2019-02-15 RX ORDER — OMEPRAZOLE 10 MG/1
2 CAPSULE, DELAYED RELEASE ORAL
Qty: 0 | Refills: 0 | COMMUNITY

## 2019-02-15 RX ORDER — SODIUM CHLORIDE 0.65 %
1 AEROSOL, SPRAY (ML) NASAL
Qty: 0 | Refills: 0 | COMMUNITY
Start: 2019-02-15

## 2019-02-15 RX ORDER — SODIUM CHLORIDE 0.65 %
1 AEROSOL, SPRAY (ML) NASAL
Qty: 0 | Refills: 0 | DISCHARGE
Start: 2019-02-15

## 2019-02-15 RX ADMIN — HEPARIN SODIUM 5000 UNIT(S): 5000 INJECTION INTRAVENOUS; SUBCUTANEOUS at 06:06

## 2019-02-15 RX ADMIN — BUPROPION HYDROCHLORIDE 300 MILLIGRAM(S): 150 TABLET, EXTENDED RELEASE ORAL at 11:57

## 2019-02-15 RX ADMIN — Medication 37.5 MILLIGRAM(S): at 11:57

## 2019-02-15 RX ADMIN — CLOPIDOGREL BISULFATE 75 MILLIGRAM(S): 75 TABLET, FILM COATED ORAL at 11:57

## 2019-02-15 RX ADMIN — Medication 1 SPRAY(S): at 06:07

## 2019-02-15 RX ADMIN — SODIUM CHLORIDE 75 MILLILITER(S): 9 INJECTION INTRAMUSCULAR; INTRAVENOUS; SUBCUTANEOUS at 06:08

## 2019-02-15 NOTE — DISCHARGE NOTE ADULT - MEDICATION SUMMARY - MEDICATIONS TO CHANGE
I will SWITCH the dose or number of times a day I take the medications listed below when I get home from the hospital:    OMEPRAZOLE   CAP 20MG

## 2019-02-15 NOTE — PROGRESS NOTE ADULT - SUBJECTIVE AND OBJECTIVE BOX
Subjective:    Exam:  Gen: AA NAD  Neck: no JVD  Chest: normal shape and expansion  Heart: RRR s M  Lungs: CTAB  Abdomen: Soft, NT, ND, no HSM, normoactive BS x 4  Ext: no CCE  Skin: no rash    Vital Signs Last 24 Hrs  T(C): 37.8 (15 Feb 2019 06:04), Max: 38 (2019 16:30)  T(F): 100 (15 Feb 2019 06:04), Max: 100.4 (2019 16:30)  HR: 81 (15 Feb 2019 06:04) (79 - 98)  BP: 123/69 (15 Feb 2019 06:04) (123/69 - 153/74)  BP(mean): --  RR: 18 (15 Feb 2019 06:04) (18 - 18)  SpO2: 93% (15 Feb 2019 06:04) (93% - 95%)    Daily Height in cm: 165.1 (2019 16:30)    Daily     I&O's Detail    2019 07:01  -  15 Feb 2019 07:00  --------------------------------------------------------  IN:    Oral Fluid: 240 mL    sodium chloride 0.9%.: 900 mL  Total IN: 1140 mL    OUT:  Total OUT: 0 mL    Total NET: 1140 mL          Daily     CAPILLARY BLOOD GLUCOSE      POCT Blood Glucose.: 132 mg/dL (2019 22:12)  POCT Blood Glucose.: 124 mg/dL (2019 17:19)  POCT Blood Glucose.: 112 mg/dL (2019 12:56)  POCT Blood Glucose.: 145 mg/dL (2019 08:59)      MEDICATIONS  (STANDING):  buPROPion XL . 300 milliGRAM(s) Oral daily  clopidogrel Tablet 75 milliGRAM(s) Oral daily  finasteride 5 milliGRAM(s) Oral at bedtime  heparin  Injectable 5000 Unit(s) SubCutaneous every 12 hours  insulin lispro (HumaLOG) corrective regimen sliding scale   SubCutaneous three times a day before meals  insulin lispro (HumaLOG) corrective regimen sliding scale   SubCutaneous at bedtime  sodium chloride 0.65% Nasal 1 Spray(s) Both Nostrils three times a day  sodium chloride 0.9%. 1000 milliLiter(s) (75 mL/Hr) IV Continuous <Continuous>  tamsulosin 0.4 milliGRAM(s) Oral at bedtime  venlafaxine 37.5 milliGRAM(s) Oral daily    MEDICATIONS  (PRN):  clonazePAM Tablet 2 milliGRAM(s) Oral at bedtime PRN insomnia  guaiFENesin    Syrup 200 milliGRAM(s) Oral every 6 hours PRN Cough      Labs:                          14.3   7.4   )-----------( 205      ( 15 Feb 2019 06:55 )             41.1                         13.4   7.52  )-----------( 237      ( 2019 07:51 )             40.4                         14.8   9.2   )-----------( 248      ( 2019 17:40 )             43.3       PT/INR - ( 2019 17:40 )   PT: 13.0 sec;   INR: 1.14 ratio         PTT - ( 2019 17:40 )  PTT:31.5 sec  02-15    134<L>  |  97  |  15  ----------------------------<  109<H>  4.1   |  22  |  1.02    Ca    8.5      15 Feb 2019 06:55  Phos  2.0     02-15  Mg     2.0     02-14    TPro  7.7  /  Alb  4.3  /  TBili  0.4  /  DBili  x   /  AST  20  /  ALT  18  /  AlkPhos  62  02-13        Urinalysis Basic - ( 2019 19:02 )    Color: Yellow / Appearance: Slightly Turbid / S.028 / pH: x  Gluc: x / Ketone: Negative  / Bili: Negative / Urobili: Negative   Blood: x / Protein: 30 mg/dL / Nitrite: Negative   Leuk Esterase: Negative / RBC: 1 /hpf / WBC 2 /HPF   Sq Epi: x / Non Sq Epi: 0 /hpf / Bacteria: Negative        n/c89-90-12 @ 00:16  .Blood Blood    No growth to date.  n/a  n/a  n/a  n/a  n/a    5FC: n/a  AMK: n/a  AMB:  n/a  AMP: n/a  A/S: n/a  AND: n/a  AZM: n/a  AZT: n/a  EZIO: n/a  CFZ: n/a  CFP: n/a  : n/a  CFX: n/a  CTZ: n/a  C/A: n/a  CTX: n/a  CFU: n/a  CHL: n/a  CIP: n/a  CLI: n/a  DAP: n/a  ERT: n/a  BAIRON: n/a  FLU: n/a  GEN: n/a  IMP: n/a  ITR: n/a  LEV: n/a  BREN: n/a  NKECHI: n/a  JUAN: n/a  MOX: n/a  OXA: n/a  PCN: n/a  P/T: n/a  POS: n/a  RIF: n/a  TCN: n/a  TGC: n/a  TOB: n/a  T/S: n/a  VAN: n/a  VORI: n/a Subjective:    Exam:  Gen: AA NAD  Neck: no JVD  Chest: normal shape and expansion  Heart: RRR s M  Lungs: Rales that right base  Abdomen: Soft, NT, ND, no HSM, normoactive BS x 4  Ext: no CCE  Skin: no rash    Vital Signs Last 24 Hrs  T(C): 37.8 (15 Feb 2019 06:04), Max: 38 (2019 16:30)  T(F): 100 (15 Feb 2019 06:04), Max: 100.4 (2019 16:30)  HR: 81 (15 Feb 2019 06:04) (79 - 98)  BP: 123/69 (15 Feb 2019 06:04) (123/69 - 153/74)  BP(mean): --  RR: 18 (15 Feb 2019 06:04) (18 - 18)  SpO2: 93% (15 Feb 2019 06:04) (93% - 95%)    Daily Height in cm: 165.1 (2019 16:30)    Daily     I&O's Detail    2019 07:01  -  15 Feb 2019 07:00  --------------------------------------------------------  IN:    Oral Fluid: 240 mL    sodium chloride 0.9%.: 900 mL  Total IN: 1140 mL    OUT:  Total OUT: 0 mL    Total NET: 1140 mL          Daily     CAPILLARY BLOOD GLUCOSE      POCT Blood Glucose.: 132 mg/dL (2019 22:12)  POCT Blood Glucose.: 124 mg/dL (2019 17:19)  POCT Blood Glucose.: 112 mg/dL (2019 12:56)  POCT Blood Glucose.: 145 mg/dL (2019 08:59)      MEDICATIONS  (STANDING):  buPROPion XL . 300 milliGRAM(s) Oral daily  clopidogrel Tablet 75 milliGRAM(s) Oral daily  finasteride 5 milliGRAM(s) Oral at bedtime  heparin  Injectable 5000 Unit(s) SubCutaneous every 12 hours  insulin lispro (HumaLOG) corrective regimen sliding scale   SubCutaneous three times a day before meals  insulin lispro (HumaLOG) corrective regimen sliding scale   SubCutaneous at bedtime  sodium chloride 0.65% Nasal 1 Spray(s) Both Nostrils three times a day  sodium chloride 0.9%. 1000 milliLiter(s) (75 mL/Hr) IV Continuous <Continuous>  tamsulosin 0.4 milliGRAM(s) Oral at bedtime  venlafaxine 37.5 milliGRAM(s) Oral daily    MEDICATIONS  (PRN):  clonazePAM Tablet 2 milliGRAM(s) Oral at bedtime PRN insomnia  guaiFENesin    Syrup 200 milliGRAM(s) Oral every 6 hours PRN Cough      Labs:                          14.3   7.4   )-----------( 205      ( 15 Feb 2019 06:55 )             41.1                         13.4   7.52  )-----------( 237      ( 2019 07:51 )             40.4                         14.8   9.2   )-----------( 248      ( 2019 17:40 )             43.3       PT/INR - ( 2019 17:40 )   PT: 13.0 sec;   INR: 1.14 ratio         PTT - ( 2019 17:40 )  PTT:31.5 sec  02-15    134<L>  |  97  |  15  ----------------------------<  109<H>  4.1   |  22  |  1.02    Ca    8.5      15 Feb 2019 06:55  Phos  2.0     02-15  Mg     2.0     02-14    TPro  7.7  /  Alb  4.3  /  TBili  0.4  /  DBili  x   /  AST  20  /  ALT  18  /  AlkPhos  62  02-13        Urinalysis Basic - ( 2019 19:02 )    Color: Yellow / Appearance: Slightly Turbid / S.028 / pH: x  Gluc: x / Ketone: Negative  / Bili: Negative / Urobili: Negative   Blood: x / Protein: 30 mg/dL / Nitrite: Negative   Leuk Esterase: Negative / RBC: 1 /hpf / WBC 2 /HPF   Sq Epi: x / Non Sq Epi: 0 /hpf / Bacteria: Negative        n/j58-18-09 @ 00:16  .Blood Blood    No growth to date.  n/a  n/a  n/a  n/a  n/a    5FC: n/a  AMK: n/a  AMB:  n/a  AMP: n/a  A/S: n/a  AND: n/a  AZM: n/a  AZT: n/a  EZIO: n/a  CFZ: n/a  CFP: n/a  : n/a  CFX: n/a  CTZ: n/a  C/A: n/a  CTX: n/a  CFU: n/a  CHL: n/a  CIP: n/a  CLI: n/a  DAP: n/a  ERT: n/a  BAIRON: n/a  FLU: n/a  GEN: n/a  IMP: n/a  ITR: n/a  LEV: n/a  BREN: n/a  NKECHI: n/a  JUAN: n/a  MOX: n/a  OXA: n/a  PCN: n/a  P/T: n/a  POS: n/a  RIF: n/a  TCN: n/a  TGC: n/a  TOB: n/a  T/S: n/a  VAN: n/a  VORI: n/a

## 2019-02-15 NOTE — DISCHARGE NOTE ADULT - PLAN OF CARE
resolve symptoms your chest x ray was negative;  if you develop fever or breathing difficulty please get medical assistance continue your home medication your hemoglobin A1c 6.4 in December  diet controlled;  continue diet as prescribed  follow up with your doctor as outpatient resolved had IV fluid-  continue diet as  tolerated baseline mental status now

## 2019-02-15 NOTE — DISCHARGE NOTE ADULT - MEDICATION SUMMARY - MEDICATIONS TO STOP TAKING
I will STOP taking the medications listed below when I get home from the hospital:    clindamycin 150 mg oral capsule  -- 3 cap(s) by mouth every 8 hours   -- Finish all this medication unless otherwise directed by prescriber.  Medication should be taken with plenty of water.    QNASL        AER 80MCG    QNASL        AER 80MCG  -- 1 spray(s) into nose once a day, As Needed

## 2019-02-15 NOTE — DISCHARGE NOTE ADULT - PROVIDER TOKENS
ESM in tricuspid area  GI:  + distended. abd wall edema + drain +  No masses.  Bowel sounds diminished. Mild diffuse tendereness . No hernia. PEG +   Skin: Warm and dry. No nodule on exposed extremities. No rash on exposed extremities  Lymph: No cervical LAD. No supraclavicular LAD. M/S: .  Diffuse edema in all extremities   Neuro - non focal , very weak    Labs:   Recent Labs      07/13/18   0515  07/14/18   0630   07/15/18   0620  07/15/18   1215  07/15/18   1827   WBC  8.5  11.5*   --   14.8*   --    --    HGB  10.0*  4.8*   < >  7.3*  7.4*  7.1*   HCT  30.0*  14.4*   < >  21.0*  21.6*  20.9*   PLT  91*  118*   --   213   --    --     < > = values in this interval not displayed. Recent Labs      07/13/18   0515  07/14/18   0630  07/15/18   0620   NA  136  136  130*   K  3.5  4.0  3.4*   CL  101  102  95*   CO2  25  24  27   BUN  17  27*  10   CREATININE  2.8*  3.9*  2.1*   CALCIUM  8.7  8.6  8.3   PHOS  2.5  3.4  1.6*     No results for input(s): AST, ALT, BILIDIR, BILITOT, ALKPHOS in the last 72 hours. Recent Labs      07/14/18   1230   INR  1.49*     No results for input(s): Irma Mock in the last 72 hours. Urinalysis:    Lab Results   Component Value Date    NITRU Negative 07/09/2018    WBCUA >100 07/09/2018    BACTERIA see below 07/09/2018    RBCUA see below 07/09/2018    BLOODU LARGE 07/09/2018    SPECGRAV 1.015 07/09/2018    GLUCOSEU Negative 07/09/2018       Radiology:  US GUIDED PARACENTESIS   Final Result   1. Complex peritoneal fluid with thick septations throughout the abdomen in a   pattern that would suggest underlying peritonitis. 2. Fluid is not amenable to complete drainage by paracentesis, nor by   CT-guided drainage. Based upon results of fluid analysis, treatment may   require further antibiotics or surgical consultation. 3. Successful collection of approximately 510 mL of thin, dark brown fluid   sent to the lab for culture and analysis.          CT Chest WO Contrast gallbladder disease. Bibasilar pneumonias and pulmonary nodules   consistent with septic emboli. Mild anasarca. RECOMMENDATIONS:   NG tube should be pulled back about 4 cm since it is pressing against the   anterior stomach wall. Gallbladder ultrasound suggested for evaluation of gallbladder disease. Diagnostic Ultrasound-guided paracentesis may be helpful. CT Chest WO Contrast   Final Result   Multiple scattered cavitary and solid nodules scattered throughout the lungs   suspected to be from septic emboli. The emboli may be from recurrent   endocarditis, IV drug abuse, or infected DVT. Scattered focal pneumonia in   the lingula, right middle lobe, and right lower lobe. Trace bilateral   pleural effusions. Tip of the endotracheal tube lying near the tee. Moderate ascites seen in the upper abdomen. Possible multiple splenic   infarcts or abscesses. Please refer to the dictation of the CT scan of the   abdomen and pelvis. RECOMMENDATIONS:   Endotracheal tube should be pulled back 1-2 cm. CT Head WO Contrast   Final Result   No acute intracranial abnormality. XR CHEST PORTABLE   Final Result   Moderate lingular opacity favored to be pneumonia. Moderate opacity in the   right lung base probably also pneumonia. Development of multiple pulmonary   nodules presumed to be of an infectious or least inflammatory etiology. Low   lung volumes. Some improvement in the appearance of the chest since   yesterday. RECOMMENDATION:   CT scan of the chest with contrast recommended for further evaluation. XR ABDOMEN (KUB) (SINGLE AP VIEW)   Final Result   Probable mild nonobstructive ileus. Moderate to large amount of stool in the   left colon. XR CHEST PORTABLE   Final Result   Improving bilateral airspace disease. XR CHEST PORTABLE   Final Result   Worsening bilateral airspace disease, probably pneumonia.          XR CHEST PORTABLE   Final Result PROVIDER:[TOKEN:[[id number]]],PROVIDER:[TOKEN:[[id number]]]

## 2019-02-15 NOTE — DISCHARGE NOTE ADULT - HOSPITAL COURSE
74yo M with borderline diabetes, PTSD on antidepressants and benzodiazepine, BPH, who now presents with lightheadedness and weakness progressing over the last 2-3 days.  found to have adenoviris; chest x ray negative, afebrile.  Neurological exam non-focal.  CT head negative.  seen by neurology;  - no further inpatient neurological workup indicated;  cleared by MD for discharge home; follow up with DR. Duarte, neurologist  in 1 week.

## 2019-02-15 NOTE — DISCHARGE NOTE ADULT - PATIENT PORTAL LINK FT
You can access the [email or web address] Patient Portal, offered by [facility], by registering with the following website: [email or web address]

## 2019-02-15 NOTE — DISCHARGE NOTE ADULT - CARE PLAN
Principal Discharge DX:	Adenovirus infection  Goal:	resolve symptoms  Assessment and plan of treatment:	your chest x ray was negative;  if you develop fever or breathing difficulty please get medical assistance  Secondary Diagnosis:	PTSD (post-traumatic stress disorder)  Assessment and plan of treatment:	continue your home medication  Secondary Diagnosis:	Diabetes mellitus  Assessment and plan of treatment:	your hemoglobin A1c 6.4 in December  diet controlled;  continue diet as prescribed  follow up with your doctor as outpatient  Secondary Diagnosis:	Dehydration  Goal:	resolved  Assessment and plan of treatment:	had IV fluid-  continue diet as  tolerated  Secondary Diagnosis:	Encephalopathy acute  Assessment and plan of treatment:	baseline mental status now

## 2019-02-15 NOTE — DISCHARGE NOTE ADULT - CARE PROVIDER_API CALL
John Duarte)  Neurology  [address] [place] [address]  Phone: [phone]  Fax: [phone]  Follow Up Time:     Christiano Pace)  Internal Medicine; Pediatrics  [address] [facility], [address]  [place] [address]  Phone: [phone]  Fax: [phone]  Follow Up Time:

## 2019-02-15 NOTE — DISCHARGE NOTE ADULT - MEDICATION SUMMARY - MEDICATIONS TO TAKE
I will START or STAY ON the medications listed below when I get home from the hospital:    finasteride 5 mg oral tablet  -- 1 tab(s) by mouth once a day (at bedtime)  -- Indication: For Urinary retention    Flomax  -- 0.4 milligram(s) by mouth once a day (at bedtime)  -- Indication: For Urinary retention    KlonoPIN 2 mg oral tablet  -- 1 tab(s) by mouth once a day (at bedtime)  -- Indication: For PTSD (post-traumatic stress disorder)    Effexor 37.5 mg oral tablet  -- 1 tab(s) by mouth once a day  -- Indication: For PTSD (post-traumatic stress disorder)    Plavix 75 mg oral tablet  -- 1 tab(s) by mouth once a day   -- Do not take aspirin or aspirin containing products without knowledge and consent of your physician.    -- Indication: For Antiplatelet    guaiFENesin 100 mg/5 mL oral liquid  -- 10 milliliter(s) by mouth every 6 hours, As needed, Cough  -- Indication: For cough    sodium chloride 0.65% nasal spray  -- 1 spray(s) into nose 3 times a day for 1 week  and then as needed for into nose congestion  -- Indication: For nasal spray    OMEPRAZOLE   CAP 20MG  -- 2  by mouth once a day, As Needed for dyspesia  -- Indication: For for stomach    Wellbutrin  mg/24 hours oral tablet, extended release  -- 1 tab(s) by mouth once a day  -- Indication: For PTSD (post-traumatic stress disorder)

## 2019-02-15 NOTE — PROGRESS NOTE ADULT - PROBLEM SELECTOR PLAN 3
Symptomatic treatment Symptomatic treatmentright basilar rales will get chest x-ray is negative possible discharge home

## 2019-02-22 ENCOUNTER — RX RENEWAL (OUTPATIENT)
Age: 74
End: 2019-02-22

## 2019-02-27 ENCOUNTER — RX RENEWAL (OUTPATIENT)
Age: 74
End: 2019-02-27

## 2019-04-19 ENCOUNTER — OUTPATIENT (OUTPATIENT)
Dept: OUTPATIENT SERVICES | Facility: HOSPITAL | Age: 74
LOS: 1 days | End: 2019-04-19
Payer: MEDICARE

## 2019-04-19 ENCOUNTER — APPOINTMENT (OUTPATIENT)
Dept: RADIOLOGY | Facility: CLINIC | Age: 74
End: 2019-04-19
Payer: MEDICARE

## 2019-04-19 DIAGNOSIS — M19.90 UNSPECIFIED OSTEOARTHRITIS, UNSPECIFIED SITE: ICD-10-CM

## 2019-04-19 PROCEDURE — 73521 X-RAY EXAM HIPS BI 2 VIEWS: CPT

## 2019-04-19 PROCEDURE — 73521 X-RAY EXAM HIPS BI 2 VIEWS: CPT | Mod: 26

## 2019-04-24 ENCOUNTER — NON-APPOINTMENT (OUTPATIENT)
Age: 74
End: 2019-04-24

## 2019-04-24 ENCOUNTER — APPOINTMENT (OUTPATIENT)
Dept: CARDIOLOGY | Facility: CLINIC | Age: 74
End: 2019-04-24
Payer: MEDICARE

## 2019-04-24 VITALS
DIASTOLIC BLOOD PRESSURE: 80 MMHG | HEIGHT: 65 IN | OXYGEN SATURATION: 94 % | SYSTOLIC BLOOD PRESSURE: 135 MMHG | HEART RATE: 73 BPM | BODY MASS INDEX: 27.49 KG/M2 | WEIGHT: 165 LBS

## 2019-04-24 PROCEDURE — 99205 OFFICE O/P NEW HI 60 MIN: CPT

## 2019-04-24 PROCEDURE — 93000 ELECTROCARDIOGRAM COMPLETE: CPT

## 2019-04-24 RX ORDER — OMEPRAZOLE 20 MG/1
20 CAPSULE, DELAYED RELEASE ORAL DAILY
Qty: 30 | Refills: 3 | Status: DISCONTINUED | COMMUNITY
Start: 2017-06-27 | End: 2019-04-24

## 2019-04-24 RX ORDER — VITAMIN K2 90 MCG
125 MCG CAPSULE ORAL
Refills: 0 | Status: DISCONTINUED | COMMUNITY

## 2019-04-24 RX ORDER — OMEPRAZOLE 20 MG/1
20 CAPSULE, DELAYED RELEASE ORAL TWICE DAILY
Qty: 60 | Refills: 5 | Status: DISCONTINUED | COMMUNITY
Start: 2017-07-14 | End: 2019-04-24

## 2019-04-24 RX ORDER — SIMVASTATIN 10 MG/1
10 TABLET, FILM COATED ORAL
Refills: 0 | Status: DISCONTINUED | COMMUNITY

## 2019-04-24 RX ORDER — GABAPENTIN 600 MG/1
600 TABLET, COATED ORAL
Refills: 0 | Status: DISCONTINUED | COMMUNITY

## 2019-04-24 RX ORDER — FINASTERIDE 5 MG/1
5 TABLET, FILM COATED ORAL DAILY
Refills: 0 | Status: DISCONTINUED | COMMUNITY
End: 2019-04-24

## 2019-04-24 RX ORDER — PANTOPRAZOLE SODIUM 40 MG/1
40 TABLET, DELAYED RELEASE ORAL DAILY
Refills: 0 | Status: DISCONTINUED | COMMUNITY

## 2019-04-24 RX ORDER — SILDENAFIL 20 MG/1
20 TABLET ORAL
Refills: 0 | Status: DISCONTINUED | COMMUNITY

## 2019-04-24 RX ORDER — CHLORHEXIDINE GLUCONATE 4 %
1000 LIQUID (ML) TOPICAL
Refills: 0 | Status: DISCONTINUED | COMMUNITY

## 2019-04-24 RX ORDER — DICLOFENAC SODIUM 10 MG/G
1 GEL TOPICAL
Refills: 0 | Status: DISCONTINUED | COMMUNITY

## 2019-04-24 RX ORDER — BECLOMETHASONE DIPROPIONATE 80 UG/1
80 AEROSOL, METERED NASAL
Refills: 0 | Status: DISCONTINUED | COMMUNITY

## 2019-04-24 RX ORDER — MULTIVIT-MIN/FOLIC/VIT K/LYCOP 400-300MCG
TABLET ORAL
Refills: 0 | Status: DISCONTINUED | COMMUNITY

## 2019-04-24 RX ORDER — HYDROCORTISONE 25 MG/G
2.5 OINTMENT TOPICAL TWICE DAILY
Refills: 0 | Status: DISCONTINUED | COMMUNITY

## 2019-04-24 NOTE — DISCUSSION/SUMMARY
[FreeTextEntry1] : SVT likely Atach driven by recent viral illness, emotional stress, and EMI. No related symptoms. Started on Cardizem. Will check echo.

## 2019-04-24 NOTE — HISTORY OF PRESENT ILLNESS
[FreeTextEntry1] : Mr. Baig presents for evaluation of SVT. He was in hospital in February with the Adenovirus. They note after discharge he still had chiills for one day and had hyperventilation. Since that resolved it took time for him to get back on his feet. He also had sudden death onset of his brother. He is also planning to do 100 mile pilgrimage walk.  He walks regularly without provoked symptoms. He has was started on Cardizem which was titrated to 120 mg qd. He is physically active and does physical work without symptoms. He denies syncope. ECGs in hospital in February were normal. He did note have an echo. Since his monitor he had +EMI test and was started on CPAP.

## 2019-04-24 NOTE — PHYSICAL EXAM
[Normal Appearance] : normal appearance [General Appearance - Well Developed] : well developed [General Appearance - Well Nourished] : well nourished [Well Groomed] : well groomed [No Deformities] : no deformities [Normal Conjunctiva] : the conjunctiva exhibited no abnormalities [General Appearance - In No Acute Distress] : no acute distress [Eyelids - No Xanthelasma] : the eyelids demonstrated no xanthelasmas [Normal Oral Mucosa] : normal oral mucosa [No Oral Pallor] : no oral pallor [No Oral Cyanosis] : no oral cyanosis [Normal Jugular Venous A Waves Present] : normal jugular venous A waves present [Heart Rate And Rhythm] : heart rate and rhythm were normal [No Jugular Venous Ayers A Waves] : no jugular venous ayers A waves [Normal Jugular Venous V Waves Present] : normal jugular venous V waves present [Murmurs] : no murmurs present [Heart Sounds] : normal S1 and S2 [Respiration, Rhythm And Depth] : normal respiratory rhythm and effort [Exaggerated Use Of Accessory Muscles For Inspiration] : no accessory muscle use [Auscultation Breath Sounds / Voice Sounds] : lungs were clear to auscultation bilaterally [Abdomen Soft] : soft [Abdomen Tenderness] : non-tender [Abdomen Mass (___ Cm)] : no abdominal mass palpated [Abnormal Walk] : normal gait [Gait - Sufficient For Exercise Testing] : the gait was sufficient for exercise testing [Nail Clubbing] : no clubbing of the fingernails [Cyanosis, Localized] : no localized cyanosis [Petechial Hemorrhages (___cm)] : no petechial hemorrhages [] : no rash [Skin Color & Pigmentation] : normal skin color and pigmentation [Skin Lesions] : no skin lesions [No Skin Ulcers] : no skin ulcer [No Venous Stasis] : no venous stasis [No Xanthoma] : no  xanthoma was observed [Mood] : the mood was normal [Affect] : the affect was normal [Oriented To Time, Place, And Person] : oriented to person, place, and time [No Anxiety] : not feeling anxious

## 2019-04-25 ENCOUNTER — APPOINTMENT (OUTPATIENT)
Dept: CARDIOLOGY | Facility: CLINIC | Age: 74
End: 2019-04-25
Payer: MEDICARE

## 2019-04-25 PROCEDURE — 93306 TTE W/DOPPLER COMPLETE: CPT

## 2019-05-01 ENCOUNTER — APPOINTMENT (OUTPATIENT)
Dept: GASTROENTEROLOGY | Facility: CLINIC | Age: 74
End: 2019-05-01
Payer: MEDICARE

## 2019-05-01 VITALS
BODY MASS INDEX: 29.16 KG/M2 | WEIGHT: 175 LBS | SYSTOLIC BLOOD PRESSURE: 140 MMHG | DIASTOLIC BLOOD PRESSURE: 68 MMHG | HEIGHT: 65 IN | TEMPERATURE: 98.2 F

## 2019-05-01 PROCEDURE — 99214 OFFICE O/P EST MOD 30 MIN: CPT

## 2019-05-01 RX ORDER — AMOXICILLIN AND CLAVULANATE POTASSIUM 875; 125 MG/1; MG/1
875-125 TABLET, COATED ORAL
Qty: 14 | Refills: 0 | Status: COMPLETED | COMMUNITY
Start: 2019-02-25

## 2019-05-01 NOTE — ASSESSMENT
[FreeTextEntry1] : 1. h/o esophagitis, controlled on ppi\par \par plan recommend ppi daily, increase dose to 40mg\par            recommend repeat egd when patient returns from vacation\par          f/u gi in 6 months\par \par 2. h/o colon polyps\par \par plan recommend repeat colonoscopy on return , after extended prep.

## 2019-05-01 NOTE — REVIEW OF SYSTEMS
[Fever] : no fever [Chills] : no chills [Feeling Poorly] : not feeling poorly [Feeling Tired] : not feeling tired [Red Eyes] : eyes not red [Loss Of Hearing] : no hearing loss [Cough] : no cough [SOB on Exertion] : no shortness of breath during exertion [As Noted in HPI] : as noted in HPI [Dysuria] : no dysuria [Arthralgias] : no arthralgias [Joint Pain] : no joint pain [Anxiety] : no anxiety [Depression] : no depression [Muscle Weakness] : no muscle weakness [Easy Bleeding] : no tendency for easy bleeding [Easy Bruising] : no tendency for easy bruising

## 2019-05-01 NOTE — PHYSICAL EXAM
[General Appearance - Alert] : alert [General Appearance - In No Acute Distress] : in no acute distress [General Appearance - Well Nourished] : well nourished [General Appearance - Well Developed] : well developed [Sclera] : the sclera and conjunctiva were normal [Respiration, Rhythm And Depth] : normal respiratory rhythm and effort [Auscultation Breath Sounds / Voice Sounds] : lungs were clear to auscultation bilaterally [Heart Rate And Rhythm] : heart rate was normal and rhythm regular [Heart Sounds] : normal S1 and S2 [Bowel Sounds] : normal bowel sounds [Abdomen Soft] : soft [Abdomen Tenderness] : non-tender [No CVA Tenderness] : no ~M costovertebral angle tenderness [Abnormal Walk] : normal gait [Nail Clubbing] : no clubbing  or cyanosis of the fingernails [Motor Tone] : muscle strength and tone were normal [Sensation] : the sensory exam was normal to light touch and pinprick [Motor Exam] : the motor exam was normal [No Focal Deficits] : no focal deficits [Oriented To Time, Place, And Person] : oriented to person, place, and time

## 2019-05-01 NOTE — HISTORY OF PRESENT ILLNESS
[FreeTextEntry1] : 75yo male with h/o colon polyps last colonoscopy  09/2017  revealed hemorrhoids, diverticulosis, hemorrhoids,suboptimal bowel prep, repeat recommended in 2018.,h/o esophagitis, LA grade c s/p egd 11/2017. Patient was in hospital earlier this year 02/2019 with adenovirus and had an episode of SVT. \par Also  grieving for sudden death of brother in work related accident.  Patient saw Cardiology, no further evaluation needed. \par \par Patient denies dysphagia, gerd controlled on ppi daily. gaining weight. normal bms, no brbpr, no melena. no abdominal pain, no n/v.

## 2019-05-07 ENCOUNTER — RESULT REVIEW (OUTPATIENT)
Age: 74
End: 2019-05-07

## 2019-06-25 ENCOUNTER — APPOINTMENT (OUTPATIENT)
Dept: GASTROENTEROLOGY | Facility: CLINIC | Age: 74
End: 2019-06-25
Payer: MEDICARE

## 2019-06-25 VITALS
HEIGHT: 65 IN | SYSTOLIC BLOOD PRESSURE: 100 MMHG | TEMPERATURE: 98.2 F | WEIGHT: 170 LBS | DIASTOLIC BLOOD PRESSURE: 60 MMHG | BODY MASS INDEX: 28.32 KG/M2

## 2019-06-25 DIAGNOSIS — K20.8 OTHER ESOPHAGITIS: ICD-10-CM

## 2019-06-25 PROCEDURE — 99214 OFFICE O/P EST MOD 30 MIN: CPT

## 2019-06-25 NOTE — PHYSICAL EXAM
[General Appearance - Alert] : alert [General Appearance - In No Acute Distress] : in no acute distress [General Appearance - Well Developed] : well developed [Sclera] : the sclera and conjunctiva were normal [General Appearance - Well Nourished] : well nourished [Hearing Threshold Finger Rub Not [place]] : hearing was normal [Heart Rate And Rhythm] : heart rate was normal and rhythm regular [Heart Sounds] : normal S1 and S2 [Abdomen Tenderness] : non-tender [Abdomen Soft] : soft [Bowel Sounds] : normal bowel sounds [No CVA Tenderness] : no ~M costovertebral angle tenderness [Skin Lesions] : no skin lesions [Abnormal Walk] : normal gait [] : no rash [No Focal Deficits] : no focal deficits [Motor Exam] : the motor exam was normal [Sensation] : the sensory exam was normal to light touch and pinprick [Oriented To Time, Place, And Person] : oriented to person, place, and time

## 2019-06-26 NOTE — ASSESSMENT
[FreeTextEntry1] : 1.h/o colon polyps recommend survillance colonoscopy at this time after 2 day prep\par \par plan colonoscopy to be scheduled\par \par 2.h/o esophagitis recommend egd for surveillance\par \par plan egd to be scheduled\par       ppi indefinitiely \par \par Discussed risks including but not limited to bleeding,infection,drug reaction, perforation,missed lesion,benefits and alternatives of colonoscopy/egd with patient  including no\par treatment and patient consents to procedure.\par

## 2019-06-26 NOTE — HISTORY OF PRESENT ILLNESS
[FreeTextEntry1] : 73 yo male with h/o colon polyps 09/2017,suboptimal bowel prep, diverticulosis, hemorrhoids. recommend repeat in 2018. h/o esophagititis LA grade c last egd 2017. \par \par GERD controlled on ppi, no dysphagia, weight stable, normal bms, no brbpr, no melena.

## 2019-06-26 NOTE — REVIEW OF SYSTEMS
[As Noted in HPI] : as noted in HPI [Arthralgias] : arthralgias [Joint Pain] : joint pain [Fever] : no fever [Chills] : no chills [Loss Of Hearing] : no hearing loss [Dysuria] : no dysuria [Skin Lesions] : no skin lesions [Incontinence] : no incontinence [Dizziness] : no dizziness [Anxiety] : no anxiety [Depression] : no depression [Fainting] : no fainting [Muscle Weakness] : no muscle weakness [Easy Bleeding] : no tendency for easy bleeding [Easy Bruising] : no tendency for easy bruising

## 2019-06-28 ENCOUNTER — LABORATORY RESULT (OUTPATIENT)
Age: 74
End: 2019-06-28

## 2019-06-28 ENCOUNTER — APPOINTMENT (OUTPATIENT)
Dept: GASTROENTEROLOGY | Facility: CLINIC | Age: 74
End: 2019-06-28
Payer: MEDICARE

## 2019-06-28 PROCEDURE — 43239 EGD BIOPSY SINGLE/MULTIPLE: CPT

## 2019-06-28 PROCEDURE — 45380 COLONOSCOPY AND BIOPSY: CPT

## 2019-07-05 ENCOUNTER — FORM ENCOUNTER (OUTPATIENT)
Age: 74
End: 2019-07-05

## 2019-07-06 ENCOUNTER — APPOINTMENT (OUTPATIENT)
Dept: CT IMAGING | Facility: IMAGING CENTER | Age: 74
End: 2019-07-06
Payer: MEDICARE

## 2019-07-06 ENCOUNTER — OUTPATIENT (OUTPATIENT)
Dept: OUTPATIENT SERVICES | Facility: HOSPITAL | Age: 74
LOS: 1 days | End: 2019-07-06
Payer: MEDICARE

## 2019-07-06 DIAGNOSIS — D12.1 BENIGN NEOPLASM OF APPENDIX: ICD-10-CM

## 2019-07-06 PROCEDURE — 71260 CT THORAX DX C+: CPT | Mod: 26

## 2019-07-06 PROCEDURE — 71260 CT THORAX DX C+: CPT

## 2019-07-06 PROCEDURE — 74177 CT ABD & PELVIS W/CONTRAST: CPT | Mod: 26

## 2019-07-06 PROCEDURE — 74177 CT ABD & PELVIS W/CONTRAST: CPT

## 2019-07-08 ENCOUNTER — TRANSCRIPTION ENCOUNTER (OUTPATIENT)
Age: 74
End: 2019-07-08

## 2019-07-09 ENCOUNTER — APPOINTMENT (OUTPATIENT)
Dept: SURGERY | Facility: CLINIC | Age: 74
End: 2019-07-09
Payer: MEDICARE

## 2019-07-09 VITALS
WEIGHT: 170 LBS | HEIGHT: 65 IN | TEMPERATURE: 98.5 F | HEART RATE: 66 BPM | SYSTOLIC BLOOD PRESSURE: 127 MMHG | BODY MASS INDEX: 28.32 KG/M2 | OXYGEN SATURATION: 94 % | RESPIRATION RATE: 16 BRPM | DIASTOLIC BLOOD PRESSURE: 71 MMHG

## 2019-07-09 PROCEDURE — 99204 OFFICE O/P NEW MOD 45 MIN: CPT

## 2019-07-09 RX ORDER — DILTIAZEM HYDROCHLORIDE 120 MG/1
120 TABLET, EXTENDED RELEASE ORAL
Qty: 30 | Refills: 0 | Status: DISCONTINUED | COMMUNITY
Start: 2019-04-20 | End: 2019-07-09

## 2019-07-09 RX ORDER — VITAMIN B COMPLEX
CAPSULE ORAL
Refills: 0 | Status: DISCONTINUED | COMMUNITY
End: 2019-07-09

## 2019-07-09 RX ORDER — CHROMIUM 200 MCG
TABLET ORAL
Refills: 0 | Status: ACTIVE | COMMUNITY

## 2019-07-09 RX ORDER — CLOPIDOGREL BISULFATE 75 MG/1
75 TABLET, FILM COATED ORAL
Qty: 14 | Refills: 0 | Status: DISCONTINUED | COMMUNITY
Start: 2018-12-01 | End: 2019-07-09

## 2019-07-09 RX ORDER — DILTIAZEM HYDROCHLORIDE 30 MG/1
30 TABLET ORAL
Qty: 90 | Refills: 0 | Status: DISCONTINUED | COMMUNITY
Start: 2019-04-12 | End: 2019-07-09

## 2019-07-09 NOTE — ASSESSMENT
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 I recommended he undergo a laparoscopic appendectomy with resection of the base of the cecum. I will also perform a concurrent primary umbilical hernia repair. I explained that there is a small chance he may require additional surgery based on the final pathology. However the most likely scenario is that this represents either a benign or a very low-grade neoplastic process which will require no additional surgery as long as the margins are negative. I explained the risks benefits and alternatives including the risks of bleeding infection and the possible need for an open procedure.

## 2019-07-09 NOTE — PHYSICAL EXAM
[Normal Breath Sounds] : Normal breath sounds [Normal Heart Sounds] : normal heart sounds [No Rash or Lesion] : No rash or lesion [No HSM] : no hepatosplenomegaly [Calm] : calm [Alert] : alert [de-identified] : Soft, nontender, small fat-containing reducible umbilical hernia [de-identified] : nad

## 2019-07-09 NOTE — CONSULT LETTER
[Dear  ___] : Dear  [unfilled], [Consult Letter:] : I had the pleasure of evaluating your patient, [unfilled]. [Consult Closing:] : Thank you very much for allowing me to participate in the care of this patient.  If you have any questions, please do not hesitate to contact me. [Please see my note below.] : Please see my note below. [FreeTextEntry3] : Singh Maza M.D., F.A.C.S, F.A.S.C.R.S [Sincerely,] : Sincerely, [DrRose  ___] : Dr. CRUZ

## 2019-07-09 NOTE — HISTORY OF PRESENT ILLNESS
[de-identified] : Akin is a 73 y/o male here for a consultation visit. Last colonoscopy by Dr. Fuentes (6/28/19) demonstrates a 2 cm mass in the appendiceal orifice. 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 Other findings include moderate diverticulosis throughout the colon and grade I internal hemorrhoids. CT ABD (6/6/19) demonstrates no bowel obstruction. Colonic diverticulosis. Small hiatus hernia. Dilated and fluid-filled appendix measuring up to 9 mm in diameter, consistent with given history of appendix mucinous neoplasm. No periappendiceal inflammatory stranding. The patient feels well denies abdominal pain nausea vomiting rectal bleeding or any change in his bowel habits.

## 2019-07-11 ENCOUNTER — OUTPATIENT (OUTPATIENT)
Dept: OUTPATIENT SERVICES | Facility: HOSPITAL | Age: 74
LOS: 1 days | End: 2019-07-11
Payer: MEDICARE

## 2019-07-11 VITALS
HEIGHT: 65 IN | SYSTOLIC BLOOD PRESSURE: 135 MMHG | WEIGHT: 166.01 LBS | DIASTOLIC BLOOD PRESSURE: 70 MMHG | HEART RATE: 70 BPM | OXYGEN SATURATION: 97 % | RESPIRATION RATE: 20 BRPM | TEMPERATURE: 98 F

## 2019-07-11 DIAGNOSIS — Z98.890 OTHER SPECIFIED POSTPROCEDURAL STATES: Chronic | ICD-10-CM

## 2019-07-11 DIAGNOSIS — D12.1 BENIGN NEOPLASM OF APPENDIX: ICD-10-CM

## 2019-07-11 DIAGNOSIS — G47.33 OBSTRUCTIVE SLEEP APNEA (ADULT) (PEDIATRIC): ICD-10-CM

## 2019-07-11 DIAGNOSIS — Z01.818 ENCOUNTER FOR OTHER PREPROCEDURAL EXAMINATION: ICD-10-CM

## 2019-07-11 DIAGNOSIS — F43.10 POST-TRAUMATIC STRESS DISORDER, UNSPECIFIED: ICD-10-CM

## 2019-07-11 LAB
ANION GAP SERPL CALC-SCNC: 11 MMOL/L — SIGNIFICANT CHANGE UP (ref 5–17)
BUN SERPL-MCNC: 26 MG/DL — HIGH (ref 7–23)
CALCIUM SERPL-MCNC: 9.4 MG/DL — SIGNIFICANT CHANGE UP (ref 8.4–10.5)
CHLORIDE SERPL-SCNC: 99 MMOL/L — SIGNIFICANT CHANGE UP (ref 96–108)
CO2 SERPL-SCNC: 26 MMOL/L — SIGNIFICANT CHANGE UP (ref 22–31)
CREAT SERPL-MCNC: 1.07 MG/DL — SIGNIFICANT CHANGE UP (ref 0.5–1.3)
GLUCOSE SERPL-MCNC: 120 MG/DL — HIGH (ref 70–99)
HBA1C BLD-MCNC: 6.5 % — HIGH (ref 4–5.6)
HCT VFR BLD CALC: 44.4 % — SIGNIFICANT CHANGE UP (ref 39–50)
HGB BLD-MCNC: 15 G/DL — SIGNIFICANT CHANGE UP (ref 13–17)
MCHC RBC-ENTMCNC: 29.6 PG — SIGNIFICANT CHANGE UP (ref 27–34)
MCHC RBC-ENTMCNC: 33.8 GM/DL — SIGNIFICANT CHANGE UP (ref 32–36)
MCV RBC AUTO: 87.7 FL — SIGNIFICANT CHANGE UP (ref 80–100)
PLATELET # BLD AUTO: 247 K/UL — SIGNIFICANT CHANGE UP (ref 150–400)
POTASSIUM SERPL-MCNC: 4.6 MMOL/L — SIGNIFICANT CHANGE UP (ref 3.5–5.3)
POTASSIUM SERPL-SCNC: 4.6 MMOL/L — SIGNIFICANT CHANGE UP (ref 3.5–5.3)
RBC # BLD: 5.06 M/UL — SIGNIFICANT CHANGE UP (ref 4.2–5.8)
RBC # FLD: 12.4 % — SIGNIFICANT CHANGE UP (ref 10.3–14.5)
SODIUM SERPL-SCNC: 136 MMOL/L — SIGNIFICANT CHANGE UP (ref 135–145)
WBC # BLD: 4.71 K/UL — SIGNIFICANT CHANGE UP (ref 3.8–10.5)
WBC # FLD AUTO: 4.71 K/UL — SIGNIFICANT CHANGE UP (ref 3.8–10.5)

## 2019-07-11 PROCEDURE — 85027 COMPLETE CBC AUTOMATED: CPT

## 2019-07-11 PROCEDURE — 80048 BASIC METABOLIC PNL TOTAL CA: CPT

## 2019-07-11 PROCEDURE — 83036 HEMOGLOBIN GLYCOSYLATED A1C: CPT

## 2019-07-11 PROCEDURE — G0463: CPT

## 2019-07-11 RX ORDER — CLONAZEPAM 1 MG
1 TABLET ORAL
Qty: 0 | Refills: 0 | DISCHARGE

## 2019-07-11 RX ORDER — CEFOTETAN DISODIUM 1 G
2 VIAL (EA) INJECTION ONCE
Refills: 0 | Status: DISCONTINUED | OUTPATIENT
Start: 2019-07-15 | End: 2019-07-15

## 2019-07-11 NOTE — H&P PST ADULT - RS GEN PE MLT RESP DETAILS PC
respirations non-labored/airway patent/clear to auscultation bilaterally/good air movement/breath sounds equal/normal

## 2019-07-11 NOTE — H&P PST ADULT - NSICDXPROBLEM_GEN_ALL_CORE_FT
PROBLEM DIAGNOSES  Problem: Benign neoplasm of appendix  Assessment and Plan: Laparoscopic Appendectomy on 7/15/19  Pre- Op Instructions discussed   Labs sent     Problem: PTSD (post-traumatic stress disorder)  Assessment and Plan: continue current medication PROBLEM DIAGNOSES  Problem: EMI on CPAP  Assessment and Plan: OR Booking   EMI Precautions    Problem: PTSD (post-traumatic stress disorder)  Assessment and Plan: continue current medication     Problem: Benign neoplasm of appendix  Assessment and Plan: Laparoscopic Appendectomy on 7/15/19  Pre- Op Instructions discussed   Labs sent

## 2019-07-11 NOTE — H&P PST ADULT - NSICDXFAMILYHX_GEN_ALL_CORE_FT
FAMILY HISTORY:  Father  Still living? Unknown  Family history of stroke, Age at diagnosis: Age Unknown
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
## 2019-07-11 NOTE — H&P PST ADULT - NSICDXPASTMEDICALHX_GEN_ALL_CORE_FT
PAST MEDICAL HISTORY:  Borderline diabetic     PTSD (post-traumatic stress disorder)     Seasonal allergies PAST MEDICAL HISTORY:  Borderline diabetic     BPH (benign prostatic hyperplasia)     EMI on CPAP     PTSD (post-traumatic stress disorder)     Seasonal allergies     SVT (supraventricular tachycardia) on diltizem follwed by cardiology ,last echo 5/2018

## 2019-07-14 ENCOUNTER — TRANSCRIPTION ENCOUNTER (OUTPATIENT)
Age: 74
End: 2019-07-14

## 2019-07-15 ENCOUNTER — APPOINTMENT (OUTPATIENT)
Dept: SURGERY | Facility: HOSPITAL | Age: 74
End: 2019-07-15
Payer: MEDICARE

## 2019-07-15 ENCOUNTER — RESULT REVIEW (OUTPATIENT)
Age: 74
End: 2019-07-15

## 2019-07-15 ENCOUNTER — OUTPATIENT (OUTPATIENT)
Dept: OUTPATIENT SERVICES | Facility: HOSPITAL | Age: 74
LOS: 1 days | End: 2019-07-15
Payer: MEDICARE

## 2019-07-15 VITALS
TEMPERATURE: 98 F | RESPIRATION RATE: 16 BRPM | WEIGHT: 169.76 LBS | HEART RATE: 57 BPM | HEIGHT: 65 IN | DIASTOLIC BLOOD PRESSURE: 71 MMHG | OXYGEN SATURATION: 96 % | SYSTOLIC BLOOD PRESSURE: 116 MMHG

## 2019-07-15 VITALS
DIASTOLIC BLOOD PRESSURE: 60 MMHG | SYSTOLIC BLOOD PRESSURE: 104 MMHG | RESPIRATION RATE: 17 BRPM | HEART RATE: 59 BPM | OXYGEN SATURATION: 95 %

## 2019-07-15 DIAGNOSIS — Z98.890 OTHER SPECIFIED POSTPROCEDURAL STATES: Chronic | ICD-10-CM

## 2019-07-15 DIAGNOSIS — D12.1 BENIGN NEOPLASM OF APPENDIX: ICD-10-CM

## 2019-07-15 LAB — GLUCOSE BLDC GLUCOMTR-MCNC: 100 MG/DL — HIGH (ref 70–99)

## 2019-07-15 PROCEDURE — C1889: CPT

## 2019-07-15 PROCEDURE — 88331 PATH CONSLTJ SURG 1 BLK 1SPC: CPT | Mod: 26

## 2019-07-15 PROCEDURE — 49585: CPT

## 2019-07-15 PROCEDURE — 44970 LAPAROSCOPY APPENDECTOMY: CPT

## 2019-07-15 PROCEDURE — 94660 CPAP INITIATION&MGMT: CPT

## 2019-07-15 PROCEDURE — 88331 PATH CONSLTJ SURG 1 BLK 1SPC: CPT

## 2019-07-15 PROCEDURE — 88302 TISSUE EXAM BY PATHOLOGIST: CPT | Mod: 26

## 2019-07-15 PROCEDURE — 88304 TISSUE EXAM BY PATHOLOGIST: CPT

## 2019-07-15 PROCEDURE — 88302 TISSUE EXAM BY PATHOLOGIST: CPT

## 2019-07-15 PROCEDURE — 88304 TISSUE EXAM BY PATHOLOGIST: CPT | Mod: 26

## 2019-07-15 PROCEDURE — 82962 GLUCOSE BLOOD TEST: CPT

## 2019-07-15 RX ORDER — DILTIAZEM HCL 120 MG
1 CAPSULE, EXT RELEASE 24 HR ORAL
Qty: 0 | Refills: 0 | DISCHARGE

## 2019-07-15 RX ORDER — TAMSULOSIN HYDROCHLORIDE 0.4 MG/1
0.4 CAPSULE ORAL
Qty: 0 | Refills: 0 | DISCHARGE

## 2019-07-15 RX ORDER — ONDANSETRON 8 MG/1
4 TABLET, FILM COATED ORAL ONCE
Refills: 0 | Status: DISCONTINUED | OUTPATIENT
Start: 2019-07-15 | End: 2019-07-15

## 2019-07-15 RX ORDER — ACETAMINOPHEN 500 MG
650 TABLET ORAL
Qty: 0 | Refills: 0 | DISCHARGE
Start: 2019-07-15

## 2019-07-15 RX ORDER — OMEPRAZOLE 10 MG/1
2 CAPSULE, DELAYED RELEASE ORAL
Qty: 0 | Refills: 0 | DISCHARGE

## 2019-07-15 RX ORDER — CHOLECALCIFEROL (VITAMIN D3) 125 MCG
2 CAPSULE ORAL
Qty: 0 | Refills: 0 | DISCHARGE

## 2019-07-15 RX ORDER — OXYCODONE HYDROCHLORIDE 5 MG/1
1 TABLET ORAL
Qty: 20 | Refills: 0
Start: 2019-07-15

## 2019-07-15 RX ORDER — SODIUM CHLORIDE 9 MG/ML
1000 INJECTION, SOLUTION INTRAVENOUS
Refills: 0 | Status: DISCONTINUED | OUTPATIENT
Start: 2019-07-15 | End: 2019-07-30

## 2019-07-15 RX ORDER — FINASTERIDE 5 MG/1
1 TABLET, FILM COATED ORAL
Qty: 0 | Refills: 0 | DISCHARGE

## 2019-07-15 RX ORDER — BUPROPION HYDROCHLORIDE 150 MG/1
1 TABLET, EXTENDED RELEASE ORAL
Qty: 0 | Refills: 0 | DISCHARGE

## 2019-07-15 RX ORDER — LIDOCAINE HCL 20 MG/ML
0.2 VIAL (ML) INJECTION ONCE
Refills: 0 | Status: COMPLETED | OUTPATIENT
Start: 2019-07-15 | End: 2019-07-15

## 2019-07-15 RX ORDER — CLONAZEPAM 1 MG
1 TABLET ORAL
Qty: 0 | Refills: 0 | DISCHARGE

## 2019-07-15 RX ORDER — VENLAFAXINE HCL 75 MG
1 CAPSULE, EXT RELEASE 24 HR ORAL
Qty: 0 | Refills: 0 | DISCHARGE

## 2019-07-15 RX ORDER — SODIUM CHLORIDE 9 MG/ML
3 INJECTION INTRAMUSCULAR; INTRAVENOUS; SUBCUTANEOUS EVERY 8 HOURS
Refills: 0 | Status: DISCONTINUED | OUTPATIENT
Start: 2019-07-15 | End: 2019-07-15

## 2019-07-15 RX ORDER — ACETAMINOPHEN 500 MG
1000 TABLET ORAL ONCE
Refills: 0 | Status: DISCONTINUED | OUTPATIENT
Start: 2019-07-15 | End: 2019-07-15

## 2019-07-15 RX ORDER — CHLORHEXIDINE GLUCONATE 213 G/1000ML
1 SOLUTION TOPICAL DAILY
Refills: 0 | Status: DISCONTINUED | OUTPATIENT
Start: 2019-07-15 | End: 2019-07-15

## 2019-07-15 RX ORDER — MONTELUKAST 4 MG/1
1 TABLET, CHEWABLE ORAL
Qty: 0 | Refills: 0 | DISCHARGE

## 2019-07-15 RX ADMIN — SODIUM CHLORIDE 30 MILLILITER(S): 9 INJECTION, SOLUTION INTRAVENOUS at 16:06

## 2019-07-15 RX ADMIN — CHLORHEXIDINE GLUCONATE 1 APPLICATION(S): 213 SOLUTION TOPICAL at 12:19

## 2019-07-15 RX ADMIN — SODIUM CHLORIDE 3 MILLILITER(S): 9 INJECTION INTRAMUSCULAR; INTRAVENOUS; SUBCUTANEOUS at 12:19

## 2019-07-15 RX ADMIN — Medication 0.2 MILLILITER(S): at 12:20

## 2019-07-15 NOTE — PRE-OP CHECKLIST - LATEX ALLERGY
" Anesthesia Evaluation     Patient summary reviewed   no history of anesthetic complications:  NPO Solid Status: > 8 hours  NPO Liquid Status: > 8 hours     Airway   Mallampati: II  TM distance: >3 FB  Neck ROM: full  Dental - normal exam     Pulmonary - normal exam   (+) shortness of breath,   (-) COPD, asthma, sleep apnea, not a smoker    ROS comment: Sarcoidosis  Cough with abnormal Chest CT      Per Dr Bella's note:   The reading of that CT scan of the chest was \"bilateral dance sharply marginated infiltrates may represent fibrosis indicating stage IV sarcoidosis, primarily seen along the pleural margins\".   Cardiovascular - normal exam  Exercise tolerance: good (4-7 METS)    ECG reviewed    (+) hypertension, hyperlipidemia      Neuro/Psych  (-) seizures, TIA, CVA  GI/Hepatic/Renal/Endo    (+) morbid obesity, GERD,   (-) liver disease, no renal disease, diabetes    Musculoskeletal     Abdominal    Substance History      OB/GYN          Other                                              Anesthesia Plan    ASA 3     general     intravenous induction   Anesthetic plan and risks discussed with patient.      "
no

## 2019-07-15 NOTE — BRIEF OPERATIVE NOTE - OPERATION/FINDINGS
laparoscopic appendectomy for appendiceal mucinous cystadenoma, frozen section sent and found to have clear margins  3cm umbilical hernia, repaired primarily

## 2019-07-15 NOTE — PRE-ANESTHESIA EVALUATION ADULT - NSANTHPMHFT_GEN_ALL_CORE
75 y/o male with PMH of PTSD, Pre- Diabetic, GERD , EMI on CPAP, SVT currently on Cardizem followed by cardiology . Pt had recent colonoscopy demonstrates a 2 cm mass in the appendiceal orifice. 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 Presents to PST for scheduled Laparoscopic Appendectomy on 7/15/19.

## 2019-07-15 NOTE — BRIEF OPERATIVE NOTE - NSICDXBRIEFPROCEDURE_GEN_ALL_CORE_FT
PROCEDURES:  Lap appendectomy 15-Jul-2019 14:11:27  Debra Méndez  Repair, hernia, umbilical, open, adult 15-Jul-2019 14:12:16  Debra Méndez

## 2019-07-15 NOTE — BRIEF OPERATIVE NOTE - NSICDXBRIEFPOSTOP_GEN_ALL_CORE_FT
POST-OP DIAGNOSIS:  Umbilical hernia 15-Jul-2019 14:11:54  Debra Méndez  Appendiceal mucinous cystadenoma 15-Jul-2019 14:11:39  Debra Méndez

## 2019-07-17 PROBLEM — I47.1 SUPRAVENTRICULAR TACHYCARDIA: Chronic | Status: ACTIVE | Noted: 2019-07-11

## 2019-07-17 PROBLEM — N40.0 BENIGN PROSTATIC HYPERPLASIA WITHOUT LOWER URINARY TRACT SYMPTOMS: Chronic | Status: ACTIVE | Noted: 2019-07-11

## 2019-07-17 PROBLEM — J30.2 OTHER SEASONAL ALLERGIC RHINITIS: Chronic | Status: ACTIVE | Noted: 2019-07-11

## 2019-07-17 PROBLEM — G47.33 OBSTRUCTIVE SLEEP APNEA (ADULT) (PEDIATRIC): Chronic | Status: ACTIVE | Noted: 2019-07-11

## 2019-07-19 LAB — SURGICAL PATHOLOGY STUDY: SIGNIFICANT CHANGE UP

## 2019-07-22 ENCOUNTER — RX RENEWAL (OUTPATIENT)
Age: 74
End: 2019-07-22

## 2019-07-23 LAB — SURGICAL PATHOLOGY STUDY: SIGNIFICANT CHANGE UP

## 2019-07-30 ENCOUNTER — APPOINTMENT (OUTPATIENT)
Dept: SURGERY | Facility: CLINIC | Age: 74
End: 2019-07-30
Payer: MEDICARE

## 2019-07-30 VITALS
DIASTOLIC BLOOD PRESSURE: 62 MMHG | SYSTOLIC BLOOD PRESSURE: 121 MMHG | RESPIRATION RATE: 16 BRPM | TEMPERATURE: 98.6 F | OXYGEN SATURATION: 95 % | HEART RATE: 64 BPM

## 2019-07-30 DIAGNOSIS — Z86.018 PERSONAL HISTORY OF OTHER BENIGN NEOPLASM: ICD-10-CM

## 2019-07-30 PROCEDURE — 99024 POSTOP FOLLOW-UP VISIT: CPT

## 2019-07-30 NOTE — ASSESSMENT
[FreeTextEntry1] : In summary the patient is doing well status post laparoscopic appendectomy for a low-grade mucinous neoplasm of the appendix with negative margins and umbilical hernia repair. I instructed him that he may resume his normal activities as tolerated. I will see him in one month for final checkup.

## 2019-07-30 NOTE — CONSULT LETTER
[Dear  ___] : Dear  [unfilled], [Consult Letter:] : I had the pleasure of evaluating your patient, [unfilled]. [Sincerely,] : Sincerely, [Consult Closing:] : Thank you very much for allowing me to participate in the care of this patient.  If you have any questions, please do not hesitate to contact me. [Please see my note below.] : Please see my note below. [FreeTextEntry3] : Singh Maza M.D., F.A.C.S, F.A.S.C.R.S [DrRose  ___] : Dr. CRUZ

## 2019-07-30 NOTE — REASON FOR VISIT
[Post Op: _________] : a [unfilled] post op visit [FreeTextEntry1] : Laparoscopic appendectomy. Umbilical hernia repair.

## 2019-07-30 NOTE — HISTORY OF PRESENT ILLNESS
[de-identified] : The patient is 2 weeks status post laparoscopic appendectomy and primary repair of an umbilical hernia. His postoperative course has been uncomplicated. In the office today he feels well denies pain nausea or vomiting or fever. His pathology reveals a low grade mucinous neoplasm of the appendix with negative margins.

## 2019-08-27 ENCOUNTER — APPOINTMENT (OUTPATIENT)
Dept: SURGERY | Facility: CLINIC | Age: 74
End: 2019-08-27
Payer: MEDICARE

## 2019-08-27 VITALS
HEART RATE: 56 BPM | DIASTOLIC BLOOD PRESSURE: 77 MMHG | OXYGEN SATURATION: 96 % | RESPIRATION RATE: 16 BRPM | SYSTOLIC BLOOD PRESSURE: 148 MMHG | TEMPERATURE: 98.5 F

## 2019-08-27 PROCEDURE — 99024 POSTOP FOLLOW-UP VISIT: CPT

## 2019-08-27 NOTE — ASSESSMENT
[FreeTextEntry1] : In summary the patient is doing well a month and a half status post laparoscopic appendectomy and primary repair of an umbilical hernia. There is still some mild erythema of his incision without fluctuance or tenderness. I will see him in 3 weeks to be sure that this is resolving. At the present time I do not feel that there is any indication for antibiotics

## 2019-08-27 NOTE — HISTORY OF PRESENT ILLNESS
[de-identified] : SANDRA GUTIERREZ is a 74 year old male being seen for a 7/15/19 post op visit, Laparoscopic appendectomy. Umbilical hernia repair. He feels well denies pain or fever. The patient states that the redness of his incision is improving.

## 2019-08-27 NOTE — PHYSICAL EXAM
[de-identified] : Soft, nontender, mild residual erythema of the incision without fluctuance or tenderness

## 2019-09-17 ENCOUNTER — APPOINTMENT (OUTPATIENT)
Dept: SURGERY | Facility: CLINIC | Age: 74
End: 2019-09-17

## 2019-09-24 ENCOUNTER — APPOINTMENT (OUTPATIENT)
Dept: SURGERY | Facility: CLINIC | Age: 74
End: 2019-09-24
Payer: MEDICARE

## 2019-09-24 VITALS
HEART RATE: 59 BPM | OXYGEN SATURATION: 95 % | RESPIRATION RATE: 16 BRPM | DIASTOLIC BLOOD PRESSURE: 64 MMHG | SYSTOLIC BLOOD PRESSURE: 125 MMHG | TEMPERATURE: 98.7 F

## 2019-09-24 DIAGNOSIS — Z09 ENCOUNTER FOR FOLLOW-UP EXAMINATION AFTER COMPLETED TREATMENT FOR CONDITIONS OTHER THAN MALIGNANT NEOPLASM: ICD-10-CM

## 2019-09-24 PROCEDURE — 99024 POSTOP FOLLOW-UP VISIT: CPT

## 2019-09-24 NOTE — PHYSICAL EXAM
[de-identified] : Soft, nontender, erythema of the umbilicus has completely resolved. There is no evidence of infection or persistent umbilical hernia per

## 2019-09-24 NOTE — HISTORY OF PRESENT ILLNESS
[de-identified] : SANDRA GUTIERREZ is a 74 year old male being seen for a 7/15/19 post op visit, Laparoscopic appendectomy. Umbilical hernia repair. In the office today he feels well. He denies pain or fever.

## 2019-09-24 NOTE — ASSESSMENT
[FreeTextEntry1] : In summary the patient is doing well status post umbilical hernia repair and laparoscopic appendectomy. I instructed him that he may resume his normal activities as tolerated and only needs to see me as needed

## 2019-12-01 ENCOUNTER — RX RENEWAL (OUTPATIENT)
Age: 74
End: 2019-12-01

## 2019-12-31 NOTE — PRE-ANESTHESIA EVALUATION ADULT - NSANTHRISKNONERD_GEN_ALL_CORE
No risk alerts present [Fainting] : fainting [Dizziness] : dizziness [Headache] : headache [Normal] : Psychiatric

## 2020-01-03 ENCOUNTER — RX RENEWAL (OUTPATIENT)
Age: 75
End: 2020-01-03

## 2020-02-04 ENCOUNTER — RX RENEWAL (OUTPATIENT)
Age: 75
End: 2020-02-04

## 2020-08-24 NOTE — ED CDU PROVIDER INITIAL DAY NOTE - EYES NEGATIVE STATEMENT, MLM
Normal, please notify patient   no discharge, no irritation, no pain, no redness, and no visual changes.

## 2020-11-19 ENCOUNTER — TRANSCRIPTION ENCOUNTER (OUTPATIENT)
Age: 75
End: 2020-11-19

## 2021-02-04 ENCOUNTER — TRANSCRIPTION ENCOUNTER (OUTPATIENT)
Age: 76
End: 2021-02-04

## 2021-06-23 ENCOUNTER — APPOINTMENT (OUTPATIENT)
Dept: GASTROENTEROLOGY | Facility: CLINIC | Age: 76
End: 2021-06-23
Payer: MEDICARE

## 2021-06-23 VITALS
HEART RATE: 55 BPM | BODY MASS INDEX: 26.66 KG/M2 | RESPIRATION RATE: 16 BRPM | HEIGHT: 65 IN | TEMPERATURE: 97 F | DIASTOLIC BLOOD PRESSURE: 60 MMHG | SYSTOLIC BLOOD PRESSURE: 116 MMHG | OXYGEN SATURATION: 96 % | WEIGHT: 160 LBS

## 2021-06-23 DIAGNOSIS — D12.6 BENIGN NEOPLASM OF COLON, UNSPECIFIED: ICD-10-CM

## 2021-06-23 DIAGNOSIS — K63.5 POLYP OF COLON: ICD-10-CM

## 2021-06-23 PROCEDURE — 99214 OFFICE O/P EST MOD 30 MIN: CPT

## 2021-06-23 NOTE — HISTORY OF PRESENT ILLNESS
[FreeTextEntry1] : 77 yo  male with h/o Weiss's esophagus , no dysplasia, s/p egd in 2019.. Patient s/p colonoscopy in 2019 mucinous appendiceal cancer, diverticulosis, ih, s/p appendectomy and cecum removal in 2019,laparascopic. normal bms, no brbpr, no melena. no abdominal pain, no n/v. no gerd. intentional weight loss.\par \par no family h/0 colon or gastric cancer

## 2021-06-23 NOTE — ASSESSMENT
[FreeTextEntry1] : 1. h/o slater's esophagus, gerd controlled on ppi, h/o esophageal stricture in past\par \par plan ppi daily\par         antireflux diet\par           surveillance egd in 2023\par \par 2. h/o colon polyps, s/p removal mucinous appendiceal tumor, no spread\par \par plan; repeat colonoscopy in 2024

## 2021-06-23 NOTE — PHYSICAL EXAM
[General Appearance - Alert] : alert [General Appearance - In No Acute Distress] : in no acute distress [General Appearance - Well Nourished] : well nourished [General Appearance - Well Developed] : well developed [Hearing Threshold Finger Rub Not [place]] : hearing was normal [Sclera] : the sclera and conjunctiva were normal [Respiration, Rhythm And Depth] : normal respiratory rhythm and effort [Auscultation Breath Sounds / Voice Sounds] : lungs were clear to auscultation bilaterally [Heart Sounds] : normal S1 and S2 [Bowel Sounds] : normal bowel sounds [Abdomen Soft] : soft [Abdomen Tenderness] : non-tender [No CVA Tenderness] : no ~M costovertebral angle tenderness [Abnormal Walk] : normal gait [Nail Clubbing] : no clubbing  or cyanosis of the fingernails [] : no rash [Skin Lesions] : no skin lesions [No Focal Deficits] : no focal deficits [Oriented To Time, Place, And Person] : oriented to person, place, and time

## 2021-06-23 NOTE — REVIEW OF SYSTEMS
[As Noted in HPI] : as noted in HPI [Fever] : no fever [Chills] : no chills [Eyesight Problems] : no eyesight problems [Discharge From Eyes] : no purulent discharge from the eyes [Nosebleeds] : no nosebleeds [Chest Pain] : no chest pain [Cough] : no cough [SOB on Exertion] : no shortness of breath during exertion [Hesitancy] : no urinary hesitancy [Nocturia] : no nocturia [Joint Swelling] : no joint swelling [Joint Stiffness] : no joint stiffness [Itching] : no itching [Change In A Mole] : no change in a mole [Dizziness] : no dizziness [Fainting] : no fainting [Anxiety] : no anxiety [Depression] : no depression [Muscle Weakness] : no muscle weakness [Erectile Dysfunction] : no erectile dysfunction [Swollen Glands] : no swollen glands [Swollen Glands In The Neck] : no swollen glands in the neck

## 2021-11-15 ENCOUNTER — TRANSCRIPTION ENCOUNTER (OUTPATIENT)
Age: 76
End: 2021-11-15

## 2021-12-17 NOTE — ED CDU PROVIDER SUBSEQUENT DAY NOTE - DATE/TIME 2
PATIENT INFORMATION    Anticipatory guidance discussed  Avoid potential choking hazards (large, spherical, or coin shaped foods)  Avoid small toys (choking hazard)  Car seat issues, including proper placement and transition to toddler seat at 20 pounds  Caution with possible poisons (including pills, plants, cosmetics)  Child-proof home with cabinet locks, outlet plugs, window guards, and stair safety griffith  Discipline issues (limit-setting, positive reinforcement)  Fluoride supplementation if unfluoridated water supply    Follow-Up  - Return for your 3 year well child visit.    2 1/2 years old Health and Safety Tips - The following hyperlinks are available to access via [email or web address]    Parent Education from [employer]    Educación para padres sobre niños sanos    Common dosing for acetaminophen and ibuprofen:   Acetaminophen (Tylenol) can be given every 4 hours.  · Infant or Children's Elixir: 160mg/5ml for  Weight 18-23 lbs 24-35 lbs 36-47 lbs   Dose 3.75 ml 5 ml 7.5 ml      Weight 48-59 lbs 60-71 lsb 72-95 lbs   Dose 10 ml 12.5 ml 15 ml     Ibuprofen (Motrin) can be given every 6 hours.  Safe for children 6 months and older.  · Infant Drops: 50mg/1.25ml  Weight 12-17 lbs 18-23 lbs   Dose 1.25 ml 1.875 ml     · Children's Elixir 100mg/5ml   Weight 12-17 lbs 18-23 lbs 24-35 lbs 36-47 lbs   Dose 2.5 ml 3.75 ml 5 ml 7.5 ml        Weight 48-59 lbs 60-71 lbs 72-95 lbs   Dose 10 ml 12.5 ml 15 ml     Additional Educational Resources:  For additional resources regarding your symptoms, diagnosis, or further health information, please visit the Discover a Healthier You section on [email or web address] or the Online Health Resources section in [email or web address].   28-Nov-2017 07:00

## 2022-01-21 ENCOUNTER — TRANSCRIPTION ENCOUNTER (OUTPATIENT)
Age: 77
End: 2022-01-21

## 2022-04-13 ENCOUNTER — APPOINTMENT (OUTPATIENT)
Dept: ORTHOPEDIC SURGERY | Facility: CLINIC | Age: 77
End: 2022-04-13
Payer: MEDICARE

## 2022-04-13 VITALS — HEIGHT: 65 IN | WEIGHT: 170 LBS | BODY MASS INDEX: 28.32 KG/M2

## 2022-04-13 DIAGNOSIS — M76.51 PATELLAR TENDINITIS, RIGHT KNEE: ICD-10-CM

## 2022-04-13 PROCEDURE — 99203 OFFICE O/P NEW LOW 30 MIN: CPT

## 2022-04-13 PROCEDURE — 73562 X-RAY EXAM OF KNEE 3: CPT | Mod: RT

## 2022-04-13 NOTE — DISCUSSION/SUMMARY
[de-identified] : Impression pretibial tubercle bursitis query retained foreign body\par Plan Baseline CBC sed rate CRP.  Patient has metal shrapnel through body therefore not a candidate for MRI.  If inflammatory markers negative observe if they are positive possible ultrasound-guided aspiration

## 2022-04-13 NOTE — HISTORY OF PRESENT ILLNESS
[de-identified] : tHIS IS A 77-YEAR-OLD MALE.  10 DAYS AGO BEGAN EXPERIENCE CHRONIC INTERMITTENT PAIN ANTERIOR ASPECT RIGHT KNEE.  pATIENT DOES GARDENING AND DID HAVE SKIN LESION OVER THE ANTERIOR RIGHT KNEE WITH A "SCAB".  pAIN INTERMITTENT AND EXPERIENCE WHEN GETTING UP IN THE MORNING AND EPISODICALLY DURING THE DAY WHEN WALKING.  dENIES LOCKING GIVING OUT.  iS WALKING INDEPENDENTLY.  dENIES FEVER CHILLS

## 2022-04-13 NOTE — PHYSICAL EXAM
[de-identified] : Constitutional:Well nourished , well developed and in no acute distress\par Psychiatric: Alert and oriented to time place and person.Appropriate affect \par Skin:Head, neck, arms and lower extremities:no lesions or discoloration\par HEENT: Normocephalic, EOM intact, Nasal septum midline,\par Respiratory: Unlabored respirations,no audible wheezing ,no tachypnea, no cyanosis\par Cardiovascular: no leg swelling  no ankle edema no JVD, pulse regular\par Vascular: no calf or thigh tenderness, \par Peripheral pulses; intact\par Lymphatics:No groin adenopathy,no lymphedema lower  or upper extremities\par Right knee mild tenderness and fluctuance over patellar tendon no knee effusion flexion full 0/130 ligaments intact peripheral pulses intact [de-identified] : X-rays right knee multiple views reveal joint spaces maintained

## 2022-04-21 ENCOUNTER — NON-APPOINTMENT (OUTPATIENT)
Age: 77
End: 2022-04-21

## 2022-04-21 ENCOUNTER — EMERGENCY (EMERGENCY)
Facility: HOSPITAL | Age: 77
LOS: 1 days | Discharge: HOME | End: 2022-04-21
Attending: EMERGENCY MEDICINE
Payer: MEDICARE

## 2022-04-21 VITALS
SYSTOLIC BLOOD PRESSURE: 147 MMHG | OXYGEN SATURATION: 96 % | RESPIRATION RATE: 18 BRPM | WEIGHT: 169.98 LBS | HEIGHT: 65 IN | DIASTOLIC BLOOD PRESSURE: 78 MMHG | HEART RATE: 103 BPM | TEMPERATURE: 98 F

## 2022-04-21 DIAGNOSIS — Z98.890 OTHER SPECIFIED POSTPROCEDURAL STATES: Chronic | ICD-10-CM

## 2022-04-21 LAB
A1C WITH ESTIMATED AVERAGE GLUCOSE RESULT: 6.5 % — HIGH (ref 4–5.6)
ALBUMIN SERPL ELPH-MCNC: 4.1 G/DL — SIGNIFICANT CHANGE UP (ref 3.3–5)
ALP SERPL-CCNC: 70 U/L — SIGNIFICANT CHANGE UP (ref 40–120)
ALT FLD-CCNC: 19 U/L — SIGNIFICANT CHANGE UP (ref 10–45)
ANION GAP SERPL CALC-SCNC: 15 MMOL/L — SIGNIFICANT CHANGE UP (ref 5–17)
AST SERPL-CCNC: 20 U/L — SIGNIFICANT CHANGE UP (ref 10–40)
BASOPHILS # BLD AUTO: 0.06 K/UL
BASOPHILS # BLD AUTO: 0.06 K/UL — SIGNIFICANT CHANGE UP (ref 0–0.2)
BASOPHILS NFR BLD AUTO: 0.6 % — SIGNIFICANT CHANGE UP (ref 0–2)
BASOPHILS NFR BLD AUTO: 0.7 %
BILIRUB SERPL-MCNC: 0.4 MG/DL — SIGNIFICANT CHANGE UP (ref 0.2–1.2)
BUN SERPL-MCNC: 23 MG/DL — SIGNIFICANT CHANGE UP (ref 7–23)
CALCIUM SERPL-MCNC: 9.3 MG/DL — SIGNIFICANT CHANGE UP (ref 8.4–10.5)
CHLORIDE SERPL-SCNC: 101 MMOL/L — SIGNIFICANT CHANGE UP (ref 96–108)
CO2 SERPL-SCNC: 20 MMOL/L — LOW (ref 22–31)
CREAT SERPL-MCNC: 0.9 MG/DL — SIGNIFICANT CHANGE UP (ref 0.5–1.3)
CRP SERPL-MCNC: 18 MG/L — HIGH (ref 0–4)
CRP SERPL-MCNC: <3 MG/L
EGFR: 88 ML/MIN/1.73M2 — SIGNIFICANT CHANGE UP
EOSINOPHIL # BLD AUTO: 0.12 K/UL — SIGNIFICANT CHANGE UP (ref 0–0.5)
EOSINOPHIL # BLD AUTO: 0.23 K/UL
EOSINOPHIL NFR BLD AUTO: 1.2 % — SIGNIFICANT CHANGE UP (ref 0–6)
EOSINOPHIL NFR BLD AUTO: 2.7 %
ERYTHROCYTE [SEDIMENTATION RATE] IN BLOOD BY WESTERGREN METHOD: 28 MM/HR
ERYTHROCYTE [SEDIMENTATION RATE] IN BLOOD: 36 MM/HR — HIGH (ref 0–20)
ESTIMATED AVERAGE GLUCOSE: 140 MG/DL — HIGH (ref 68–114)
GLUCOSE SERPL-MCNC: 194 MG/DL — HIGH (ref 70–99)
HCT VFR BLD CALC: 44.9 % — SIGNIFICANT CHANGE UP (ref 39–50)
HCT VFR BLD CALC: 47.9 %
HGB BLD-MCNC: 14.8 G/DL — SIGNIFICANT CHANGE UP (ref 13–17)
HGB BLD-MCNC: 15.6 G/DL
IMM GRANULOCYTES NFR BLD AUTO: 0.2 %
IMM GRANULOCYTES NFR BLD AUTO: 0.3 % — SIGNIFICANT CHANGE UP (ref 0–1.5)
LYMPHOCYTES # BLD AUTO: 1.02 K/UL — SIGNIFICANT CHANGE UP (ref 1–3.3)
LYMPHOCYTES # BLD AUTO: 1.85 K/UL
LYMPHOCYTES # BLD AUTO: 9.9 % — LOW (ref 13–44)
LYMPHOCYTES NFR BLD AUTO: 21.7 %
MAN DIFF?: NORMAL
MCHC RBC-ENTMCNC: 29.4 PG — SIGNIFICANT CHANGE UP (ref 27–34)
MCHC RBC-ENTMCNC: 30 PG
MCHC RBC-ENTMCNC: 32.6 GM/DL
MCHC RBC-ENTMCNC: 33 GM/DL — SIGNIFICANT CHANGE UP (ref 32–36)
MCV RBC AUTO: 89.3 FL — SIGNIFICANT CHANGE UP (ref 80–100)
MCV RBC AUTO: 92.1 FL
MONOCYTES # BLD AUTO: 0.76 K/UL
MONOCYTES # BLD AUTO: 0.93 K/UL — HIGH (ref 0–0.9)
MONOCYTES NFR BLD AUTO: 8.9 %
MONOCYTES NFR BLD AUTO: 9 % — SIGNIFICANT CHANGE UP (ref 2–14)
NEUTROPHILS # BLD AUTO: 5.6 K/UL
NEUTROPHILS # BLD AUTO: 8.17 K/UL — HIGH (ref 1.8–7.4)
NEUTROPHILS NFR BLD AUTO: 65.8 %
NEUTROPHILS NFR BLD AUTO: 79 % — HIGH (ref 43–77)
NRBC # BLD: 0 /100 WBCS — SIGNIFICANT CHANGE UP (ref 0–0)
PLATELET # BLD AUTO: 264 K/UL — SIGNIFICANT CHANGE UP (ref 150–400)
PLATELET # BLD AUTO: 285 K/UL
POTASSIUM SERPL-MCNC: 4.3 MMOL/L — SIGNIFICANT CHANGE UP (ref 3.5–5.3)
POTASSIUM SERPL-SCNC: 4.3 MMOL/L — SIGNIFICANT CHANGE UP (ref 3.5–5.3)
PROT SERPL-MCNC: 7.3 G/DL — SIGNIFICANT CHANGE UP (ref 6–8.3)
RBC # BLD: 5.03 M/UL — SIGNIFICANT CHANGE UP (ref 4.2–5.8)
RBC # BLD: 5.2 M/UL
RBC # FLD: 12.1 % — SIGNIFICANT CHANGE UP (ref 10.3–14.5)
RBC # FLD: 12.4 %
SARS-COV-2 RNA SPEC QL NAA+PROBE: SIGNIFICANT CHANGE UP
SODIUM SERPL-SCNC: 136 MMOL/L — SIGNIFICANT CHANGE UP (ref 135–145)
WBC # BLD: 10.33 K/UL — SIGNIFICANT CHANGE UP (ref 3.8–10.5)
WBC # FLD AUTO: 10.33 K/UL — SIGNIFICANT CHANGE UP (ref 3.8–10.5)
WBC # FLD AUTO: 8.52 K/UL

## 2022-04-21 PROCEDURE — 73564 X-RAY EXAM KNEE 4 OR MORE: CPT | Mod: 26,RT

## 2022-04-21 PROCEDURE — 99220: CPT

## 2022-04-21 RX ORDER — ACETAMINOPHEN 500 MG
975 TABLET ORAL ONCE
Refills: 0 | Status: COMPLETED | OUTPATIENT
Start: 2022-04-21 | End: 2022-04-21

## 2022-04-21 RX ORDER — KETOROLAC TROMETHAMINE 30 MG/ML
15 SYRINGE (ML) INJECTION ONCE
Refills: 0 | Status: DISCONTINUED | OUTPATIENT
Start: 2022-04-21 | End: 2022-04-21

## 2022-04-21 RX ORDER — CEFAZOLIN SODIUM 1 G
1000 VIAL (EA) INJECTION EVERY 8 HOURS
Refills: 0 | Status: DISCONTINUED | OUTPATIENT
Start: 2022-04-21 | End: 2022-04-24

## 2022-04-21 RX ORDER — SODIUM CHLORIDE 9 MG/ML
500 INJECTION INTRAMUSCULAR; INTRAVENOUS; SUBCUTANEOUS ONCE
Refills: 0 | Status: COMPLETED | OUTPATIENT
Start: 2022-04-21 | End: 2022-04-21

## 2022-04-21 RX ORDER — VENLAFAXINE HCL 75 MG
75 CAPSULE, EXT RELEASE 24 HR ORAL DAILY
Refills: 0 | Status: DISCONTINUED | OUTPATIENT
Start: 2022-04-21 | End: 2022-04-24

## 2022-04-21 RX ORDER — TAMSULOSIN HYDROCHLORIDE 0.4 MG/1
0.4 CAPSULE ORAL AT BEDTIME
Refills: 0 | Status: DISCONTINUED | OUTPATIENT
Start: 2022-04-21 | End: 2022-04-24

## 2022-04-21 RX ORDER — ACETAMINOPHEN 500 MG
975 TABLET ORAL EVERY 6 HOURS
Refills: 0 | Status: DISCONTINUED | OUTPATIENT
Start: 2022-04-21 | End: 2022-04-24

## 2022-04-21 RX ORDER — CLONAZEPAM 1 MG
2 TABLET ORAL AT BEDTIME
Refills: 0 | Status: COMPLETED | OUTPATIENT
Start: 2022-04-21 | End: 2022-04-28

## 2022-04-21 RX ORDER — FINASTERIDE 5 MG/1
5 TABLET, FILM COATED ORAL DAILY
Refills: 0 | Status: DISCONTINUED | OUTPATIENT
Start: 2022-04-21 | End: 2022-04-24

## 2022-04-21 RX ORDER — DILTIAZEM HCL 120 MG
120 CAPSULE, EXT RELEASE 24 HR ORAL DAILY
Refills: 0 | Status: DISCONTINUED | OUTPATIENT
Start: 2022-04-21 | End: 2022-04-24

## 2022-04-21 RX ORDER — IBUPROFEN 200 MG
600 TABLET ORAL ONCE
Refills: 0 | Status: DISCONTINUED | OUTPATIENT
Start: 2022-04-21 | End: 2022-04-21

## 2022-04-21 RX ORDER — IBUPROFEN 200 MG
600 TABLET ORAL EVERY 6 HOURS
Refills: 0 | Status: DISCONTINUED | OUTPATIENT
Start: 2022-04-21 | End: 2022-04-24

## 2022-04-21 RX ORDER — DILTIAZEM HCL 120 MG
120 CAPSULE, EXT RELEASE 24 HR ORAL DAILY
Refills: 0 | Status: DISCONTINUED | OUTPATIENT
Start: 2022-04-21 | End: 2022-04-21

## 2022-04-21 RX ORDER — KETOROLAC TROMETHAMINE 30 MG/ML
10 SYRINGE (ML) INJECTION ONCE
Refills: 0 | Status: DISCONTINUED | OUTPATIENT
Start: 2022-04-21 | End: 2022-04-21

## 2022-04-21 RX ADMIN — TAMSULOSIN HYDROCHLORIDE 0.4 MILLIGRAM(S): 0.4 CAPSULE ORAL at 23:13

## 2022-04-21 RX ADMIN — SODIUM CHLORIDE 500 MILLILITER(S): 9 INJECTION INTRAMUSCULAR; INTRAVENOUS; SUBCUTANEOUS at 15:02

## 2022-04-21 RX ADMIN — SODIUM CHLORIDE 500 MILLILITER(S): 9 INJECTION INTRAMUSCULAR; INTRAVENOUS; SUBCUTANEOUS at 17:10

## 2022-04-21 RX ADMIN — Medication 100 MILLIGRAM(S): at 15:03

## 2022-04-21 RX ADMIN — Medication 1000 MILLIGRAM(S): at 23:42

## 2022-04-21 RX ADMIN — Medication 100 MILLIGRAM(S): at 23:12

## 2022-04-21 RX ADMIN — Medication 1000 MILLIGRAM(S): at 17:09

## 2022-04-21 RX ADMIN — FINASTERIDE 5 MILLIGRAM(S): 5 TABLET, FILM COATED ORAL at 23:13

## 2022-04-21 RX ADMIN — Medication 975 MILLIGRAM(S): at 11:05

## 2022-04-21 RX ADMIN — Medication 15 MILLIGRAM(S): at 15:17

## 2022-04-21 RX ADMIN — Medication 975 MILLIGRAM(S): at 23:41

## 2022-04-21 RX ADMIN — Medication 2 MILLIGRAM(S): at 23:13

## 2022-04-21 NOTE — ED CDU PROVIDER DISPOSITION NOTE - NSFOLLOWUPCLINICS_GEN_ALL_ED_FT
[facility]  Endocrinology  [address] [place] [address]  Phone: [phone]  Fax:   Follow Up Time: 7-10 Days

## 2022-04-21 NOTE — ED CDU PROVIDER DISPOSITION NOTE - ATTENDING APP SHARED VISIT CONTRIBUTION OF CARE
Attending MD Toussaint.  Pt seen and examined by me on morning of 4/22 in CDU obs unit.  Pt is a 76 yo male with pmhx of PTSD, GERD, prediabetes, recovering alcoholic/opiate addict, EMI, SBP who presented to ED with complaint of R knee swelling, redness x 2 wks.  Sxs have worsened and presented with inc erythema, warmth.  XR in ED c/w arthritis of knee.  Sent to CDU for cellulitis receiving anceph.  Sxs have not worsened.  Lyme titers sent this morning.  Pt has preserved ROM of knee, improving since arrival.  Pt has received 2 doses anceph.  Planned next dose currently and probable transition to PO abxs after this morning's dose.  Planned referral for endocrine f/u 2/2 new DM dx (Hgb A1c 6.5).

## 2022-04-21 NOTE — ED PROVIDER NOTE - CLINICAL SUMMARY MEDICAL DECISION MAKING FREE TEXT BOX
JOSE Mckeon MD:  76yo M with PMH of PTSD, recovered alcoholic, pre-diabetes, GERD, EMI, SVT, presenting with R knee pain/redness/swelling x 2 weeks. States that sx began after gardening 2 weeks ago while kneeling. Saw an orthopedic who did XR who said it was normal. Saw PCP who rene labs, which pt doesn't have results of yet. Now with R knee pain with ambulation. No focal numbness/weakness, f/c. Clinically, pt appears to have cellulitis. Low concern for septic joint. Will XR to eval for joint fluid, if neg, less likely. Will obtain sepsis labs, start abx, observe in CDU for observation and reassessment

## 2022-04-21 NOTE — ED CDU PROVIDER INITIAL DAY NOTE - DETAILS
78 y/o male with cellulitis   - IV ABX   - Pain control   - Frequent reassessment   - Discussed case with ED attending

## 2022-04-21 NOTE — ED ADULT NURSE NOTE - OBJECTIVE STATEMENT
78 y/o male presents to ED complaining of RLE swelling and redness x10 days. Pt a&ox4, endorses doing gardening ~3 weeks ago when he got a "prick" on his knee. About 10 days ago RLE swelling, pain and redness increased. Pt had XR done and negative, told to come in by PCP. Denies fever, chills, chest pain, sob, n/v/d. MD at bedside for eval. Orders to follow.

## 2022-04-21 NOTE — ED PROVIDER NOTE - NSICDXPASTMEDICALHX_GEN_ALL_CORE_FT
PAST MEDICAL HISTORY:  Borderline diabetic     BPH (benign prostatic hyperplasia)     EMI on CPAP     PTSD (post-traumatic stress disorder)     Seasonal allergies     SVT (supraventricular tachycardia) on diltizem follwed by cardiology ,last echo 5/2018

## 2022-04-21 NOTE — ED CDU PROVIDER DISPOSITION NOTE - CLINICAL COURSE
76yo M with PMH of PTSD, recovered alcoholic, pre-diabetes, GERD, EMI, SVT, presenting with R knee pain/redness/swelling x 2 weeks. Reports he noticed a puncture wound to R knee after kneeling while gardening 2 weeks ago. Wound has since healed however noticed increased knee pain, swelling, and redness since then. Saw an orthopedic who did Xray that was WNL. Then saw PCP who did blood work but pt not told results. Reports he now has knee pain with ambulation. No antibiotic use. Taking advil for pain. Denies fever/chills, numbness/tingling. Unknown last A1c. While in ED patient  had labs, fluids, pain control and IV ABX. Will admit to CDU for Ancef q8h, pain control and frequent reassessment.  In CDU*** 76yo M with PMH of PTSD, recovered alcoholic, pre-diabetes, GERD, EMI, SVT, presenting with R knee pain/redness/swelling x 2 weeks. Reports he noticed a puncture wound to R knee after kneeling while gardening 2 weeks ago. Wound has since healed however noticed increased knee pain, swelling, and redness since then. Saw an orthopedic who did Xray that was WNL. Then saw PCP who did blood work but pt not told results. Reports he now has knee pain with ambulation. No antibiotic use. Taking advil for pain. Denies fever/chills, numbness/tingling. Unknown last A1c. While in ED patient  had labs, fluids, pain control and IV ABX. Will admit to CDU for Ancef q8h, pain control and frequent reassessment.  In CDU patient had improvement of symptoms overnight. He received 3 doses of Ancef with improvement of symptoms. Patient re-evaluated in AM by ED attending Dr. Toussaint. Patient discharged jaiden in a stable condition on PO Keflex and PMD/endocrinology follow up.

## 2022-04-21 NOTE — ED CDU PROVIDER INITIAL DAY NOTE - MEDICAL DECISION MAKING DETAILS
JOSE Mckeon MD: 76yo M with PMH of PTSD, recovered alcoholic, pre-diabetes, GERD, EMI, SVT, presenting with R knee pain/redness/swelling x 2 weeks. Reports he noticed a puncture wound to R knee after kneeling while gardening 2 weeks ago. Wound has since healed however noticed increased knee pain, swelling, and redness since then. Saw an orthopedic who did Xray that was WNL. Then saw PCP who did blood work but pt not told results. Reports he now has knee pain with ambulation. No antibiotic use. Taking advil for pain. Denies fever/chills, numbness/tingling. Unknown last A1c. While in ED patient  had labs, fluids, pain control and IV ABX. Will admit to CDU for Ancef q8h, pain control and frequent reassessment.

## 2022-04-21 NOTE — ED CDU PROVIDER DISPOSITION NOTE - PATIENT PORTAL LINK FT
You can access the FollowMyHealth Patient Portal offered by [facility] by registering at the following website: [email or web address]. By joining [employer]’s FollowMyHealth portal, you will also be able to view your health information using other applications (apps) compatible with our system.

## 2022-04-21 NOTE — ED CDU PROVIDER DISPOSITION NOTE - NSFOLLOWUPINSTRUCTIONS_ED_ALL_ED_FT
1. Please follow up with your Primary Care Doctor after discharge to discuss ED visit. Bring a copy of your results to follow up appointment for review    2. Please rest, stay hydrated and continue all at home medications as previously prescribed    3. Take antibiotics Keflex 1 tab every 6 hours as prescribed and complete full course    4. Return to ED for any new or worsened symptoms including fevers, chills, spreading redness, pain, difficulty walking and any other concerns 1. Please follow up with your Primary Care Doctor after discharge to discuss ED visit. Bring a copy of your results to follow up appointment for review.    2. Additionally please follow up with an Endocrinologist regarding your Hemoglobin A1C level. You can follow up with our Endocrinology clinic (call to schedule an appointment) or discuss with your primary doctor for further management.     [facility] End[facility]  Endocrinology  [address] [place] [address]  Phone: [phone]  Fax:   Follow Up Time: 7-10 Days    3. Please rest, stay hydrated and continue all at home medications as previously prescribed    4. Take antibiotics Keflex 1 tab every 6 hours as prescribed and complete full course    5. Return to Emergency Department immediately for any new or worsened symptoms including fevers, chills, spreading redness, pain, difficulty walking and any other concerns

## 2022-04-21 NOTE — ED PROVIDER NOTE - LOWER EXTREMITY EXAM, RIGHT
R knee: + erythema and warmth inferior to patella with +ttp, mild diffuse edema noted in comparison to L knee, able to range R knee, no open wounds

## 2022-04-21 NOTE — ED PROVIDER NOTE - OBJECTIVE STATEMENT
76yo M with PMH of PTSD, recovered alcoholic, pre-diabetes, GERD, EMI, SVT, presenting with R knee pain/redness/swelling x 2 weeks. Reports he noticed a puncture wound to R knee after kneeling while gardening 2 weeks ago. Wound has since healed however noticed increased knee pain, swelling, and redness since then. Saw an orthopedic who did Xray that was WNL. Then saw PCP who did blood work but pt not told results. Reports he now has knee pain with ambulation. No antibiotic use. Taking advil for pain. Denies fever/chills, numbness/tingling. Unknown last A1c.

## 2022-04-21 NOTE — ED PROVIDER NOTE - NS ED ATTENDING STATEMENT MOD
This was a shared visit with the ALFONSO. I reviewed and verified the documentation and independently performed the documented:

## 2022-04-21 NOTE — ED CDU PROVIDER INITIAL DAY NOTE - OBJECTIVE STATEMENT
76yo M with PMH of PTSD, recovered alcoholic, pre-diabetes, GERD, EMI, SVT, presenting with R knee pain/redness/swelling x 2 weeks. Reports he noticed a puncture wound to R knee after kneeling while gardening 2 weeks ago. Wound has since healed however noticed increased knee pain, swelling, and redness since then. Saw an orthopedic who did Xray that was WNL. Then saw PCP who did blood work but pt not told results. Reports he now has knee pain with ambulation. No antibiotic use. Taking advil for pain. Denies fever/chills, numbness/tingling. Unknown last A1c. While in ED patient  had labs, fluids, pain control and IV ABX. Will admit to CDU for Ancef q8h, pain control and frequent reassessment.

## 2022-04-22 VITALS
TEMPERATURE: 98 F | HEART RATE: 68 BPM | DIASTOLIC BLOOD PRESSURE: 84 MMHG | OXYGEN SATURATION: 94 % | RESPIRATION RATE: 18 BRPM | SYSTOLIC BLOOD PRESSURE: 125 MMHG

## 2022-04-22 LAB
ALBUMIN SERPL ELPH-MCNC: 3.6 G/DL — SIGNIFICANT CHANGE UP (ref 3.3–5)
ALP SERPL-CCNC: 55 U/L — SIGNIFICANT CHANGE UP (ref 40–120)
ALT FLD-CCNC: 13 U/L — SIGNIFICANT CHANGE UP (ref 10–45)
ANION GAP SERPL CALC-SCNC: 10 MMOL/L — SIGNIFICANT CHANGE UP (ref 5–17)
AST SERPL-CCNC: 14 U/L — SIGNIFICANT CHANGE UP (ref 10–40)
B BURGDOR C6 AB SER-ACNC: NEGATIVE — SIGNIFICANT CHANGE UP
B BURGDOR IGG+IGM SER-ACNC: 0.04 INDEX — SIGNIFICANT CHANGE UP (ref 0.01–0.89)
BASOPHILS # BLD AUTO: 0.03 K/UL — SIGNIFICANT CHANGE UP (ref 0–0.2)
BASOPHILS NFR BLD AUTO: 0.3 % — SIGNIFICANT CHANGE UP (ref 0–2)
BILIRUB SERPL-MCNC: 0.3 MG/DL — SIGNIFICANT CHANGE UP (ref 0.2–1.2)
BUN SERPL-MCNC: 25 MG/DL — HIGH (ref 7–23)
CALCIUM SERPL-MCNC: 9 MG/DL — SIGNIFICANT CHANGE UP (ref 8.4–10.5)
CHLORIDE SERPL-SCNC: 104 MMOL/L — SIGNIFICANT CHANGE UP (ref 96–108)
CO2 SERPL-SCNC: 24 MMOL/L — SIGNIFICANT CHANGE UP (ref 22–31)
CREAT SERPL-MCNC: 1.2 MG/DL — SIGNIFICANT CHANGE UP (ref 0.5–1.3)
EGFR: 62 ML/MIN/1.73M2 — SIGNIFICANT CHANGE UP
EOSINOPHIL # BLD AUTO: 0.09 K/UL — SIGNIFICANT CHANGE UP (ref 0–0.5)
EOSINOPHIL NFR BLD AUTO: 1 % — SIGNIFICANT CHANGE UP (ref 0–6)
GLUCOSE SERPL-MCNC: 136 MG/DL — HIGH (ref 70–99)
HCT VFR BLD CALC: 39.1 % — SIGNIFICANT CHANGE UP (ref 39–50)
HGB BLD-MCNC: 13 G/DL — SIGNIFICANT CHANGE UP (ref 13–17)
IMM GRANULOCYTES NFR BLD AUTO: 0.3 % — SIGNIFICANT CHANGE UP (ref 0–1.5)
LYMPHOCYTES # BLD AUTO: 1.57 K/UL — SIGNIFICANT CHANGE UP (ref 1–3.3)
LYMPHOCYTES # BLD AUTO: 16.8 % — SIGNIFICANT CHANGE UP (ref 13–44)
MCHC RBC-ENTMCNC: 29.8 PG — SIGNIFICANT CHANGE UP (ref 27–34)
MCHC RBC-ENTMCNC: 33.2 GM/DL — SIGNIFICANT CHANGE UP (ref 32–36)
MCV RBC AUTO: 89.7 FL — SIGNIFICANT CHANGE UP (ref 80–100)
MONOCYTES # BLD AUTO: 1.18 K/UL — HIGH (ref 0–0.9)
MONOCYTES NFR BLD AUTO: 12.6 % — SIGNIFICANT CHANGE UP (ref 2–14)
NEUTROPHILS # BLD AUTO: 6.46 K/UL — SIGNIFICANT CHANGE UP (ref 1.8–7.4)
NEUTROPHILS NFR BLD AUTO: 69 % — SIGNIFICANT CHANGE UP (ref 43–77)
NRBC # BLD: 0 /100 WBCS — SIGNIFICANT CHANGE UP (ref 0–0)
PLATELET # BLD AUTO: 228 K/UL — SIGNIFICANT CHANGE UP (ref 150–400)
POTASSIUM SERPL-MCNC: 4.2 MMOL/L — SIGNIFICANT CHANGE UP (ref 3.5–5.3)
POTASSIUM SERPL-SCNC: 4.2 MMOL/L — SIGNIFICANT CHANGE UP (ref 3.5–5.3)
PROT SERPL-MCNC: 6.4 G/DL — SIGNIFICANT CHANGE UP (ref 6–8.3)
RBC # BLD: 4.36 M/UL — SIGNIFICANT CHANGE UP (ref 4.2–5.8)
RBC # FLD: 12 % — SIGNIFICANT CHANGE UP (ref 10.3–14.5)
SODIUM SERPL-SCNC: 138 MMOL/L — SIGNIFICANT CHANGE UP (ref 135–145)
WBC # BLD: 9.36 K/UL — SIGNIFICANT CHANGE UP (ref 3.8–10.5)
WBC # FLD AUTO: 9.36 K/UL — SIGNIFICANT CHANGE UP (ref 3.8–10.5)

## 2022-04-22 PROCEDURE — 36415 COLL VENOUS BLD VENIPUNCTURE: CPT

## 2022-04-22 PROCEDURE — 96365 THER/PROPH/DIAG IV INF INIT: CPT

## 2022-04-22 PROCEDURE — 99284 EMERGENCY DEPT VISIT MOD MDM: CPT | Mod: 25

## 2022-04-22 PROCEDURE — G0378: CPT

## 2022-04-22 PROCEDURE — 96366 THER/PROPH/DIAG IV INF ADDON: CPT

## 2022-04-22 PROCEDURE — 85652 RBC SED RATE AUTOMATED: CPT

## 2022-04-22 PROCEDURE — 86140 C-REACTIVE PROTEIN: CPT

## 2022-04-22 PROCEDURE — 96376 TX/PRO/DX INJ SAME DRUG ADON: CPT

## 2022-04-22 PROCEDURE — U0003: CPT

## 2022-04-22 PROCEDURE — 86618 LYME DISEASE ANTIBODY: CPT

## 2022-04-22 PROCEDURE — 96375 TX/PRO/DX INJ NEW DRUG ADDON: CPT

## 2022-04-22 PROCEDURE — 73564 X-RAY EXAM KNEE 4 OR MORE: CPT

## 2022-04-22 PROCEDURE — 85025 COMPLETE CBC W/AUTO DIFF WBC: CPT

## 2022-04-22 PROCEDURE — 80053 COMPREHEN METABOLIC PANEL: CPT

## 2022-04-22 PROCEDURE — 99217: CPT | Mod: FS

## 2022-04-22 PROCEDURE — 87476 LYME DIS DNA AMP PROBE: CPT

## 2022-04-22 PROCEDURE — 83036 HEMOGLOBIN GLYCOSYLATED A1C: CPT

## 2022-04-22 PROCEDURE — U0005: CPT

## 2022-04-22 RX ORDER — CEPHALEXIN 500 MG
1 CAPSULE ORAL
Qty: 28 | Refills: 0
Start: 2022-04-22 | End: 2022-04-28

## 2022-04-22 RX ADMIN — Medication 100 MILLIGRAM(S): at 07:51

## 2022-04-22 RX ADMIN — Medication 975 MILLIGRAM(S): at 00:00

## 2022-04-22 RX ADMIN — Medication 120 MILLIGRAM(S): at 05:31

## 2022-04-22 RX ADMIN — Medication 75 MILLIGRAM(S): at 05:31

## 2022-04-22 NOTE — ED ADULT NURSE REASSESSMENT NOTE - NS ED NURSE REASSESS COMMENT FT1
0900 Pt ate breakfast sans problems. IV D/c, no redness, swelling, bleeding. D/C by JAYESH Bartlett with written instructions. D/C amb, A/O x3, NAD, sans further complaints.
pt ambulated to bathroom. upon returning from bathroom pt urged to change into a hospital gown, pt complied and is now wearing gown. pt given [employer] bag for his clothes/belongings. pt no acute distress at this time, denies complaints.
pt received from Cat RN in [place]. pt states he took all of his at home medications before coming to the ED. pt asked when he can eat again and to let him know if his wife calls or comes into the ED looking for him. pt educated on use of call bell. pt no acute distress at this time, denies complaints.
pt resting comfortably, no acute distress at this time. pt denies complaints at this time.
Received pt from LEXIE Pelaez , received pt alert and responsive, oriented x4, denies any respiratory distress, SOB, or difficulty breathing. Pt transferred to CDU for R knee erythema, swelling. Endorses pain with palpation to site, declined pain medication, Pending IV Unasyn per order.  IV in place, patent and free of signs of infiltration, V/S stable, pt afebrile. Pt educated on unit and unit rules, instructed patient to notify RN of any needed assistance, Pt verbalizes understanding, Call bell placed within reach. Safety maintained. Will continue to monitor.

## 2022-04-22 NOTE — ED CDU PROVIDER SUBSEQUENT DAY NOTE - NS ED ROS FT
Constitutional: No fever or chills  Eyes: No visual changes, eye pain   CV: No chest pain or lower extremity edema  Resp: No SOB no cough  GI: No abd pain. No nausea or vomiting. No diarrhea.  : No dysuria, hematuria.   MSK: +R knee pain   Skin: +R knee redness   Psych: No complaints   Neuro: No headache. No numbness or tingling.   Endo: No DM

## 2022-04-22 NOTE — ED CDU PROVIDER SUBSEQUENT DAY NOTE - PHYSICAL EXAMINATION
GEN: Well Appearing, Nontoxic, NAD  HEENT: NC/AT, Symm Facies. Eyes clear.   CV: No JVD,+S1S2, RRR w/o m/g/r  RESP: CTAB w/o w/r/r  ABD: Soft, nt/nd  EXT/MSK: No calf tenderness.  R knee: + erythema and warmth inferior to patella with +ttp, mild diffuse edema noted in comparison to L knee, able to range R knee, no open wounds  Neuro:  AOX3 with normal speech, moving all extremities without difficulty, clear speech  PSYCH: Appropriate mood and affect

## 2022-04-22 NOTE — ED CDU PROVIDER SUBSEQUENT DAY NOTE - PROGRESS NOTE DETAILS
Patient seen at bedside in NAD.  VSS.  Patient resting comfortably without complaints. Reports feeling improved sxs. Pain and erythema improving. Full AROM without difficulty. Erythema appearing improved from previous borders. Pt evaluated by ED attending Dr. Pelayo who cleared pt for discharge home. Gave copy of and went over all results with patient at bedside. Made aware of Hemoglobin A1c elevation to 6.5 and need for outpt Endocrine f/u for diabetes. Discussed importance of PMD f/u in next 1-2 days and advised on strict return precautions. Stable for d/c. - Dhruv Roberson PA-C Patient seen at bedside in NAD.  VSS.  Patient resting comfortably without complaints. Reports feeling improved sxs. Pain and erythema improving. Full AROM without difficulty. Erythema appearing improved from previous borders. Pt evaluated by ED attending Dr. Pelayo who cleared pt for discharge home. Gave copy of and went over all results with patient at bedside. Made aware of Hemoglobin A1c elevation to 6.5 and need for outpt Endocrine f/u for diabetes. He states he's had elevated level before, has been as high as 7.0, manages with diet and his PMD closely monitors. Discussed importance of PMD f/u in next 1-2 days and advised on strict return precautions. Stable for d/c. - Dhruv Roberson PA-C

## 2022-04-22 NOTE — ED CDU PROVIDER SUBSEQUENT DAY NOTE - MEDICAL DECISION MAKING DETAILS
Attending MD Toussaint.  Pt seen and examined by me on morning of 4/22 in CDU obs unit.  Pt is a 78 yo male with pmhx of PTSD, GERD, prediabetes, recovering alcoholic/opiate addict, EMI, SBP who presented to ED with complaint of R knee swelling, redness x 2 wks.  Sxs have worsened and presented with inc erythema, warmth.  XR in ED c/w arthritis of knee.  Sent to CDU for cellulitis receiving anceph.  Sxs have not worsened.  Lyme titers sent this morning.  Pt has preserved ROM of knee, improving since arrival.  Pt has received 2 doses anceph.  Planned next dose currently and probable transition to PO abxs after this morning's dose.  Planned referral for endocrine f/u 2/2 new DM dx (Hgb A1c 6.5).

## 2022-04-24 ENCOUNTER — EMERGENCY (EMERGENCY)
Facility: HOSPITAL | Age: 77
LOS: 1 days | Discharge: HOME | End: 2022-04-24
Attending: EMERGENCY MEDICINE
Payer: MEDICARE

## 2022-04-24 VITALS
RESPIRATION RATE: 18 BRPM | TEMPERATURE: 98 F | OXYGEN SATURATION: 97 % | DIASTOLIC BLOOD PRESSURE: 73 MMHG | SYSTOLIC BLOOD PRESSURE: 125 MMHG | HEART RATE: 64 BPM

## 2022-04-24 VITALS
RESPIRATION RATE: 20 BRPM | HEART RATE: 92 BPM | TEMPERATURE: 98 F | SYSTOLIC BLOOD PRESSURE: 131 MMHG | OXYGEN SATURATION: 94 % | WEIGHT: 169.98 LBS | HEIGHT: 65 IN | DIASTOLIC BLOOD PRESSURE: 77 MMHG

## 2022-04-24 DIAGNOSIS — Z98.890 OTHER SPECIFIED POSTPROCEDURAL STATES: Chronic | ICD-10-CM

## 2022-04-24 PROCEDURE — 99284 EMERGENCY DEPT VISIT MOD MDM: CPT | Mod: GC

## 2022-04-24 PROCEDURE — 99283 EMERGENCY DEPT VISIT LOW MDM: CPT

## 2022-04-24 RX ADMIN — Medication 1 TABLET(S): at 18:17

## 2022-04-24 NOTE — ED PROVIDER NOTE - PATIENT PORTAL LINK FT
You can access the FollowMyHealth Patient Portal offered by [facility] by registering at the following website: [email or web address]. By joining [employer]’s FollowMyHealth portal, you will also be able to view your health information using other applications (apps) compatible with our system.

## 2022-04-24 NOTE — ED PROVIDER NOTE - OBJECTIVE STATEMENT
77 year old male PMH PTSD, recovered alcoholic, pre-diabetes, GERD, EMI, SVT, presenting with R knee pain/redness/swelling x 2.5 weeks. States it started after kneeling while gardening, thinks his knee was poked with thorn. Was seen a few days ago for same complaint in the ED, placed in obs for R knee cellulitis and tx w/ IV abx, then d/c on Keflex 500mg qid. Has been taking abx as prescribed but states the redness is not getting better. Taking Advil for pain/swelling. He denies fever, chills, weakness, numbness, or tingling.

## 2022-04-24 NOTE — ED PROVIDER NOTE - PROGRESS NOTE DETAILS
Matthew Love, PGY3: POCUS w/ small fluid collection, would not recommend draining given c/f sinus tract creation and possible seeding through area of cellulitis. Plan to start on Bactrim, continue Keflex, follow up in 2-3 days w/ PMD or in ED for wound check. Discussed return precautions and all questions answered. Pt in agreement w/ plan. CAOx3, NAD, VSS. Stable for d/c.

## 2022-04-24 NOTE — ED PROVIDER NOTE - CLINICAL SUMMARY MEDICAL DECISION MAKING FREE TEXT BOX
Matthew Love, PGY3: 77 year old male p/w persistent R knee cellulitis despite abx w/ likely infrapatellar bursitis. Patient well appearing, no systemic sx. Cellulitis within prior markings. Will POCUS to eval for fluid pocket. No indication for labs or advanced imaging. Will extend abx coverage with Bactrim and continuation of Keflex, PMD follow up.

## 2022-04-24 NOTE — ED ADULT NURSE NOTE - OBJECTIVE STATEMENT
PT is a 77 year old A&OX4 male who presents to the ED from home with c/o of unresolved cellulitis of the right leg. PT states he was here on 04/21/22 for cellulitis infection and was given IV abx and discharged with Keflex. PT states that "it does not look like the infection is getting any better" so he wanted to come to the ED prophylactically "before it gets any worse." PT states he took 2-3 Tylenol prior to coming to the ED. PT would rate his leg pain 5/10 and states that he has been avoiding walking for fear of further irritating his leg. PT denies SOB and fevers. PT is resting comfortably in bed, breathing unlabored, and speaking in complete sentences. Skin is warm and dry, no diaphoresis noted. Erythema, warmth, and dryness noted to right leg, no drainage noted. PT ambulatory with steady gait despite his reluctance to walk. Safety and comfort maintained. Call bell within reach.

## 2022-04-24 NOTE — ED PROVIDER NOTE - ATTENDING CONTRIBUTION TO CARE
Barry Guido MD:   I personally saw the patient and performed a substantive portion of the visit including all aspects of the medical decision making.    MDM: Patient with R infrapatellar redness and swelling, not significantly worsening with use of antibiotics. Exam with (+) mild septic infrapatellar bursitis, but no involvement to the R knee, no effusion. No evidence of septic arthritis of the knee.   MSK US showed no intraarticular knee effusion, (+) small collection of the superficial infrapatellar bursa.   Risk and benefits discussion regarding needle aspiration, but due to risk of sinus tract formation, pt and I elected to avoid aspiration and instead will increase coverage to add Bactrim and continue Keflex. Return to ED or follow-up w/ PMD for wound check to make sure patient does not require aspiration for recurrent bursitis. Strict return precautions given.

## 2022-04-24 NOTE — ED PROVIDER NOTE - PHYSICAL EXAMINATION
GENERAL: A&Ox3, non-toxic appearing, no acute distress  HEENT: NCAT, EOMI, oral mucosa moist, normal conjunctiva  RESP: no respiratory distress, speaking in full sentences  CV: RRR  MSK: no visible deformities  NEURO: no focal sensory or motor deficits, CN 2-12 grossly intact  SKIN: warm, normal color, well perfused, cellulitis rash of R infrapatellar region contained within prior skin marking, infrapatellar swelling of bursa, small R knee joint effusion  PSYCH: normal affect

## 2022-04-24 NOTE — ED PROVIDER NOTE - NSFOLLOWUPINSTRUCTIONS_ED_ALL_ED_FT
Cellulitis    Cellulitis is a skin infection caused by bacteria. This condition occurs most often in the arms and lower legs but can occur anywhere over the body. Symptoms include redness, swelling, warm skin, tenderness, and chills/fever. If you were prescribed an antibiotic medicine, take it as told by your health care provider. Do not stop taking the antibiotic even if you start to feel better. Continue taking Keflex as prescribed as well.     Follow up with your primary care doctor or return to the ER in 2-3 days for a wound check. Show the doctor the pictures on your phone.     SEEK IMMEDIATE MEDICAL CARE IF YOU HAVE ANY OF THE FOLLOWING SYMPTOMS: worsening fever, red streaks coming from affected area, vomiting or diarrhea, or dizziness/lightheadedness.     Take Tylenol 650mg (Two 325 mg pills) every 4-6 hours as needed for pain. Take Motrin 600 mg every 8 hours as needed for moderate pain -- take with food.

## 2022-04-24 NOTE — ED ADULT TRIAGE NOTE - CHIEF COMPLAINT QUOTE
lynne. leg wound cellulitis, states he was here on the 21st and stayed in CDU for IV abx and has been taking keflex daily with no improvement to the site since being discharged, denies site s/s getting worse, no fevers

## 2022-04-27 LAB — B BURGDOR DNA SPEC QL NAA+PROBE: NEGATIVE — SIGNIFICANT CHANGE UP

## 2022-05-12 ENCOUNTER — NON-APPOINTMENT (OUTPATIENT)
Age: 77
End: 2022-05-12

## 2022-07-07 ENCOUNTER — NON-APPOINTMENT (OUTPATIENT)
Age: 77
End: 2022-07-07

## 2022-07-07 NOTE — ED CDU PROVIDER INITIAL DAY NOTE - GASTROINTESTINAL NEGATIVE STATEMENT, MLM
Elisha Quan is a 53 year old female presenting for   Chief Complaint   Patient presents with   • Pre-Op Exam       patient is having a total hysterectomy with bilateral salpingectomy, cystoscopy on 7/22/22 with Dr.Sarah garcia and Dr. Maria Guadalupe Guerin at [facility].        Denies Latex allergy or sensitivity.    Medication verified and med list updated  Refills needed today: No    Patient would like communication of their results via:        Cell Phone:   Telephone Information:   Mobile [phone]     Okay to leave a message containing results? Yes     Health Maintenance Summary     COVID-19 Vaccine (3 - Booster for Pfizer series)  Overdue since 10/5/2021    Breast Cancer Screening (Yearly)  Order placed this encounter    Influenza Vaccine (1)  Next due on 9/1/2022    Depression Screening (Yearly)  Next due on 5/6/2023    Colorectal Cancer Screen- (Cologuard - Every 3 Years)  Next due on 6/15/2023    Cervical Cancer Screen 30-64 - (PAP/HPV Co-Testing - Every 5 Years)  Next due on 6/17/2026    DTaP/Tdap/Td Vaccine (2 - Td or Tdap)  Next due on 12/26/2029    Shingles Vaccine   Completed    Hepatitis B Vaccine   Aged Out    Meningococcal Vaccine   Aged Out    HPV Vaccine   Aged Out    Pneumococcal Vaccine 0-64   Aged Out           Patient is due for the topics as listed above and will talk with physician.     Blood pressure 100/60, pulse 65, weight 58.5 kg (129 lb), last menstrual period 05/03/2022, SpO2 98 %.   no abdominal pain, no bloating, no constipation, no diarrhea, no nausea and no vomiting.

## 2022-07-08 ENCOUNTER — TRANSCRIPTION ENCOUNTER (OUTPATIENT)
Age: 77
End: 2022-07-08

## 2022-07-11 ENCOUNTER — OUTPATIENT (OUTPATIENT)
Dept: OUTPATIENT SERVICES | Facility: HOSPITAL | Age: 77
LOS: 1 days | End: 2022-07-11

## 2022-07-11 ENCOUNTER — APPOINTMENT (OUTPATIENT)
Dept: DISASTER EMERGENCY | Facility: HOSPITAL | Age: 77
End: 2022-07-11

## 2022-07-11 VITALS
DIASTOLIC BLOOD PRESSURE: 59 MMHG | HEART RATE: 48 BPM | SYSTOLIC BLOOD PRESSURE: 105 MMHG | RESPIRATION RATE: 17 BRPM | OXYGEN SATURATION: 95 % | TEMPERATURE: 98 F

## 2022-07-11 VITALS
HEART RATE: 55 BPM | OXYGEN SATURATION: 94 % | TEMPERATURE: 98 F | SYSTOLIC BLOOD PRESSURE: 124 MMHG | DIASTOLIC BLOOD PRESSURE: 59 MMHG | RESPIRATION RATE: 17 BRPM

## 2022-07-11 DIAGNOSIS — Z98.890 OTHER SPECIFIED POSTPROCEDURAL STATES: Chronic | ICD-10-CM

## 2022-07-11 DIAGNOSIS — U07.1 COVID-19: ICD-10-CM

## 2022-07-11 RX ORDER — BEBTELOVIMAB 87.5 MG/ML
175 INJECTION, SOLUTION INTRAVENOUS ONCE
Refills: 0 | Status: COMPLETED | OUTPATIENT
Start: 2022-07-11 | End: 2022-07-11

## 2022-07-11 RX ADMIN — BEBTELOVIMAB 175 MILLIGRAM(S): 87.5 INJECTION, SOLUTION INTRAVENOUS at 09:40

## 2022-07-11 NOTE — CHART NOTE - NSCHARTNOTEFT_GEN_A_CORE
CC: Monoclonal Antibody Administration/COVID 19 Positive      History: Patient presents for administration of monoclonal antibody  Patient has been screened and was deemed to be a candidate.    Exposure: unknown    Symptoms/ Criteria: headache muscle aches sneezing  coughing    Inclusion Criteria: age > 75 years     Date of Positive Test: verified by clinical call center     Date of symptom onset: 7/8    Vaccine status: Moderna x 4   last 6/22    PMHx:  Family history of stroke (Father)    Infection due to severe acute respiratory syndrome coronavirus 2 (SARS-CoV-2)    PTSD (post-traumatic stress disorder)    Borderline diabetic    Seasonal allergies    BPH (benign prostatic hyperplasia)    EMI on CPAP    SVT (supraventricular tachycardia)    No significant past surgical history    History of shoulder surgery    S/P multiple system trauma surgery          T(C): 36.6 (07-11-22 @ 09:25), Max: 36.6 (07-11-22 @ 09:25)  HR: 55 (07-11-22 @ 09:25) (55 - 55)  BP: 124/59 (07-11-22 @ 09:25) (124/59 - 124/59)  RR: 17 (07-11-22 @ 09:25) (17 - 17)  SpO2: 94% (07-11-22 @ 09:25) (94% - 94%)      PE:   Appearance: NAD	  HEENT:   Normal oral mucosa.   Lymphatic: No lymphadenopathy  Cardiovascular: Normal S1 S2, No JVD, No murmurs, No edema  Respiratory: Lungs clear to auscultation	  Gastrointestinal:  Soft, Non-tender. No guarding   Skin: warm and dry  Neurologic: Non-focal  Extremities: Normal range of motion.     ASSESSMENT:  Pt is a 77y year old Male with PMH HTN  Covid +  referred to the infusion center for Monoclonal antibody administration  (Bebtelovimab).        PLAN:  - Administration procedure explained to patient   - Consent for monoclonal antibody administration obtained   - Risk & benefits discussed/all questions answered  - Administer Bebtelovimab per protocol  - will observe patient for one hour post administration  and then if stable discharge home with outpt follow up as planned by PMD.        - Patient tolerated treatment well denies complaints of chest pain/SOB/dizziness/ palpitations  - VSS for discharge home  - D/C instructions given/ fact sheet included.  - Patient to follow-up with PCP as needed.

## 2022-11-09 ENCOUNTER — APPOINTMENT (OUTPATIENT)
Dept: GASTROENTEROLOGY | Facility: CLINIC | Age: 77
End: 2022-11-09

## 2022-11-09 VITALS
HEIGHT: 65 IN | TEMPERATURE: 97.9 F | SYSTOLIC BLOOD PRESSURE: 118 MMHG | WEIGHT: 168 LBS | HEART RATE: 60 BPM | DIASTOLIC BLOOD PRESSURE: 70 MMHG | OXYGEN SATURATION: 98 % | BODY MASS INDEX: 27.99 KG/M2

## 2022-11-09 DIAGNOSIS — K22.70 BARRETT'S ESOPHAGUS W/OUT DYSPLASIA: ICD-10-CM

## 2022-11-09 PROCEDURE — 99214 OFFICE O/P EST MOD 30 MIN: CPT

## 2022-11-09 NOTE — ASSESSMENT
[FreeTextEntry1] : 1. h/o Weiss;s esophagus,recommend egd for surveillance at this time, pt with  h/o esophageal stricture s/p dilation in the past\par \par Discussed risks including but not limited to bleeding,infection,drug reaction, perforation,missed lesion,benefits and alternatives of colonoscopy/egd with patient  including no\par treatment and patient consents to procedure.\par \par plan ppi daily\par  egd to be scheduled\par \par 2. h/o colon polyps, s/p appendectomy for mucionus tumor in 2019\par \par plan repeat colonoscopy  in 2024

## 2022-11-09 NOTE — HISTORY OF PRESENT ILLNESS
[FreeTextEntry1] : 78 yo  with h/o Weiss's esophagus , s/p egd in 2019 no dysplasia. 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 patiemt reports normal bms, no brbpr no melena. no abdominal pain, no n/v, no gerd, no dysphagia, no weight loss.\par \par no family h//o colon or gastric cancer\par patient  with h/o colon polyps repeat colonoscopy in 2023

## 2022-11-09 NOTE — REVIEW OF SYSTEMS
[Chills] : no chills [Eye Pain] : no eye pain [Red Eyes] : eyes not red [Loss Of Hearing] : no hearing loss [Sore Throat] : no sore throat [Chest Pain] : no chest pain [Palpitations] : no palpitations [Joint Swelling] : no joint swelling [Anxiety] : no anxiety [Muscle Weakness] : no muscle weakness [Easy Bruising] : no tendency for easy bruising

## 2022-11-09 NOTE — PHYSICAL EXAM
[None] : no edema [No CVA Tenderness] : no CVA  tenderness [Abnormal Walk] : normal gait [No Clubbing, Cyanosis] : no clubbing or cyanosis of the fingernails [] : no rash [No Focal Deficits] : no focal deficits [Normal] : oriented to person, place, and time [Oriented To Time, Place, And Person] : oriented to person, place, and time

## 2022-12-06 ENCOUNTER — LABORATORY RESULT (OUTPATIENT)
Age: 77
End: 2022-12-06

## 2022-12-06 ENCOUNTER — APPOINTMENT (OUTPATIENT)
Dept: GASTROENTEROLOGY | Facility: CLINIC | Age: 77
End: 2022-12-06

## 2022-12-06 LAB — SARS-COV-2 N GENE NPH QL NAA+PROBE: NOT DETECTED

## 2022-12-06 PROCEDURE — 43239 EGD BIOPSY SINGLE/MULTIPLE: CPT

## 2023-03-30 NOTE — ED ADULT NURSE NOTE - PRIMARY CARE PROVIDER
lili chappell Ftsg Text: The defect edges were debeveled with a #15 scalpel blade.  Given the location of the defect, shape of the defect and the proximity to free margins a full thickness skin graft was deemed most appropriate.  Using a sterile surgical marker, the primary defect shape was transferred to the donor site. The area thus outlined was incised deep to adipose tissue with a #15 scalpel blade.  The harvested graft was then trimmed of adipose tissue until only dermis and epidermis was left.  The skin margins of the secondary defect were undermined to an appropriate distance in all directions utilizing iris scissors.  The secondary defect was closed with interrupted buried subcutaneous sutures.  The skin edges were then re-apposed with running  sutures.  The skin graft was then placed in the primary defect and oriented appropriately.

## 2023-05-16 NOTE — ASU PATIENT PROFILE, ADULT - NS TRANSFER DENTURES
"Anesthesia Transfer of Care Note    Patient: Liban Thompson    Procedure(s) Performed: Procedure(s) (LRB):  COLONOSCOPY (N/A)    Patient location: GI    Anesthesia Type: general    Transport from OR: Transported from OR on 2-3 L/min O2 by NC with adequate spontaneous ventilation    Post pain: adequate analgesia    Post assessment: no apparent anesthetic complications    Post vital signs: stable    Level of consciousness: awake    Nausea/Vomiting: no nausea/vomiting    Complications: none    Transfer of care protocol was followed      Last vitals:   Visit Vitals  BP (!) 153/67 (BP Location: Left arm, Patient Position: Lying)   Pulse 83   Temp 36.2 °C (97.2 °F) (Skin)   Resp 15   Ht 5' 10" (1.778 m)   Wt 102.1 kg (225 lb)   SpO2 100%   BMI 32.28 kg/m²     " Upper/Lower/Full

## 2023-05-17 ENCOUNTER — NON-APPOINTMENT (OUTPATIENT)
Age: 78
End: 2023-05-17

## 2023-05-31 NOTE — PRE-ANESTHESIA EVALUATION ADULT - TEMPERATURE IN CELSIUS (DEGREES C)
Detail Level: Generalized
General Sunscreen Counseling: I recommended a broad spectrum sunscreen with a SPF of 30 or higher. I discussed my preference for Physical blocking/mineral sunblocks including zinc oxide or titanium dioxide. I explained that SPF 30 sunscreens block approximately 97 percent of the sun's harmful rays. Sunscreens should be applied at least 15 minutes prior to expected sun exposure and then every 2 hours after that as long as sun exposure continues. If swimming or exercising sunscreen should be reapplied every 45 minutes to an hour after getting wet or sweating. One ounce, or the equivalent of a shot glass full of sunscreen, is adequate to protect the skin not covered by a bathing suit. I also recommended a lip balm with a sunscreen as well. Sun protective clothing can be used in lieu of sunscreen but must be worn the entire time you are exposed to the sun's rays.
36.7

## 2023-06-02 NOTE — ED PROVIDER NOTE - WEIGHT BEARING
able Siliq Counseling:  I discussed with the patient the risks of Siliq including but not limited to new or worsening depression, suicidal thoughts and behavior, immunosuppression, malignancy, posterior leukoencephalopathy syndrome, and serious infections.  The patient understands that monitoring is required including a PPD at baseline and must alert us or the primary physician if symptoms of infection or other concerning signs are noted. There is also a special program designed to monitor depression which is required with Siliq.

## 2023-06-12 ENCOUNTER — NON-APPOINTMENT (OUTPATIENT)
Age: 78
End: 2023-06-12

## 2023-07-03 RX ORDER — OMEPRAZOLE 40 MG/1
40 CAPSULE, DELAYED RELEASE ORAL
Qty: 30 | Refills: 5 | Status: ACTIVE | COMMUNITY
Start: 2019-05-01 | End: 1900-01-01

## 2023-08-29 ENCOUNTER — APPOINTMENT (OUTPATIENT)
Dept: CARDIOLOGY | Facility: CLINIC | Age: 78
End: 2023-08-29
Payer: MEDICARE

## 2023-08-29 ENCOUNTER — NON-APPOINTMENT (OUTPATIENT)
Age: 78
End: 2023-08-29

## 2023-08-29 VITALS
OXYGEN SATURATION: 95 % | DIASTOLIC BLOOD PRESSURE: 73 MMHG | WEIGHT: 170 LBS | HEART RATE: 89 BPM | HEIGHT: 65 IN | BODY MASS INDEX: 28.32 KG/M2 | SYSTOLIC BLOOD PRESSURE: 131 MMHG

## 2023-08-29 DIAGNOSIS — R53.83 OTHER FATIGUE: ICD-10-CM

## 2023-08-29 DIAGNOSIS — F32.A DEPRESSION, UNSPECIFIED: ICD-10-CM

## 2023-08-29 DIAGNOSIS — Z87.438 PERSONAL HISTORY OF OTHER DISEASES OF MALE GENITAL ORGANS: ICD-10-CM

## 2023-08-29 DIAGNOSIS — E78.5 HYPERLIPIDEMIA, UNSPECIFIED: ICD-10-CM

## 2023-08-29 PROCEDURE — 93000 ELECTROCARDIOGRAM COMPLETE: CPT

## 2023-08-29 PROCEDURE — 99204 OFFICE O/P NEW MOD 45 MIN: CPT

## 2023-08-29 RX ORDER — TAMSULOSIN HYDROCHLORIDE 0.4 MG/1
0.4 CAPSULE ORAL
Refills: 0 | Status: ACTIVE | COMMUNITY

## 2023-08-29 RX ORDER — MONTELUKAST SODIUM 10 MG/1
10 TABLET, FILM COATED ORAL
Refills: 0 | Status: DISCONTINUED | COMMUNITY
End: 2023-08-29

## 2023-08-29 RX ORDER — LEVOCETIRIZINE DIHYDROCHLORIDE 5 MG/1
5 TABLET ORAL
Refills: 0 | Status: DISCONTINUED | COMMUNITY
End: 2023-08-29

## 2023-08-29 RX ORDER — VENLAFAXINE HYDROCHLORIDE 37.5 MG/1
37.5 CAPSULE, EXTENDED RELEASE ORAL DAILY
Refills: 0 | Status: DISCONTINUED | COMMUNITY
Start: 2019-04-24 | End: 2023-08-29

## 2023-08-29 RX ORDER — GABAPENTIN 600 MG/1
600 TABLET, COATED ORAL
Refills: 0 | Status: ACTIVE | COMMUNITY
Start: 2019-04-24

## 2023-08-29 RX ORDER — DILTIAZEM HYDROCHLORIDE 120 MG/1
120 CAPSULE, EXTENDED RELEASE ORAL
Refills: 3 | Status: ACTIVE | COMMUNITY
Start: 2019-04-24

## 2023-08-29 RX ORDER — FINASTERIDE 1 MG/1
1 TABLET ORAL DAILY
Refills: 0 | Status: ACTIVE | COMMUNITY

## 2023-08-29 RX ORDER — ATORVASTATIN CALCIUM 10 MG/1
10 TABLET, FILM COATED ORAL DAILY
Refills: 0 | Status: ACTIVE | COMMUNITY
Start: 2023-08-29

## 2023-08-29 RX ORDER — BUPROPION HYDROCHLORIDE 150 MG/1
150 TABLET, FILM COATED, EXTENDED RELEASE ORAL
Refills: 0 | Status: DISCONTINUED | COMMUNITY
End: 2023-08-29

## 2023-08-29 RX ORDER — CLONAZEPAM 1 MG/1
1 TABLET ORAL
Refills: 0 | Status: ACTIVE | COMMUNITY

## 2023-08-29 RX ORDER — VENLAFAXINE 75 MG/1
75 TABLET ORAL DAILY
Refills: 0 | Status: ACTIVE | COMMUNITY
Start: 2023-08-29

## 2023-08-29 NOTE — HISTORY OF PRESENT ILLNESS
[FreeTextEntry1] : Akin is a 78-year-old gentleman DM, SVT, HLD, BPH who presents with fatigue over the last few months. ? SOB. PTSD from [place] that he takes medications for. 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  He went to [place] on vacation and came home then started to have an episode of increase fatigue. Then visited a friend and syncope after ringing the doorbell. He went to Clinic and labs were negative in the past. Saw PCP and labs negative. Tremendous decrease in energy. Gardens and works around the house.

## 2023-08-29 NOTE — REVIEW OF SYSTEMS
[SOB] : shortness of breath [Negative] : Heme/Lymph [Dyspnea on exertion] : not dyspnea during exertion [Chest Discomfort] : no chest discomfort [Palpitations] : no palpitations

## 2023-08-29 NOTE — DISCUSSION/SUMMARY
[FreeTextEntry1] : The patient is a 78-year-old gentleman DM, HLD, BPH, SVT, PTSD now recently fatigued and episode syncope.  #1 CV- normal ECG, ECHO and exercise stress test, consider cardiac CT #2 HLD- c/w atorvastatin #3 DM- diet controlled #4 SVT- c/w diltiazem #5 Psych- PTSD from [place], recovering alcoholic 40 years, c/w clonazepam, venlafaxine, gabapentin #6 BPH- c/w finasteride and tamsulosin. [EKG obtained to assist in diagnosis and management of assessed problem(s)] : EKG obtained to assist in diagnosis and management of assessed problem(s)

## 2023-09-20 ENCOUNTER — OUTPATIENT (OUTPATIENT)
Dept: OUTPATIENT SERVICES | Facility: HOSPITAL | Age: 78
LOS: 1 days | End: 2023-09-20
Payer: MEDICARE

## 2023-09-20 ENCOUNTER — APPOINTMENT (OUTPATIENT)
Dept: CV DIAGNOSTICS | Facility: HOSPITAL | Age: 78
End: 2023-09-20

## 2023-09-20 DIAGNOSIS — R94.39 ABNORMAL RESULT OF OTHER CARDIOVASCULAR FUNCTION STUDY: ICD-10-CM

## 2023-09-20 DIAGNOSIS — Z98.890 OTHER SPECIFIED POSTPROCEDURAL STATES: Chronic | ICD-10-CM

## 2023-09-20 DIAGNOSIS — R06.02 SHORTNESS OF BREATH: ICD-10-CM

## 2023-09-20 PROCEDURE — 93017 CV STRESS TEST TRACING ONLY: CPT

## 2023-09-20 PROCEDURE — 93016 CV STRESS TEST SUPVJ ONLY: CPT

## 2023-09-20 PROCEDURE — 93018 CV STRESS TEST I&R ONLY: CPT

## 2023-09-21 PROBLEM — R94.39 EQUIVOCAL STRESS TEST: Status: ACTIVE | Noted: 2023-09-21

## 2023-09-26 ENCOUNTER — APPOINTMENT (OUTPATIENT)
Dept: CARDIOLOGY | Facility: CLINIC | Age: 78
End: 2023-09-26
Payer: MEDICARE

## 2023-09-26 DIAGNOSIS — R06.02 SHORTNESS OF BREATH: ICD-10-CM

## 2023-09-26 DIAGNOSIS — I47.10 SUPRAVENTRICULAR TACHYCARDIA, UNSPECIFIED: ICD-10-CM

## 2023-09-26 DIAGNOSIS — E11.9 TYPE 2 DIABETES MELLITUS W/OUT COMPLICATIONS: ICD-10-CM

## 2023-09-26 PROCEDURE — 93306 TTE W/DOPPLER COMPLETE: CPT

## 2023-10-10 ENCOUNTER — OUTPATIENT (OUTPATIENT)
Dept: OUTPATIENT SERVICES | Facility: HOSPITAL | Age: 78
LOS: 1 days | End: 2023-10-10
Payer: MEDICARE

## 2023-10-10 ENCOUNTER — APPOINTMENT (OUTPATIENT)
Dept: CT IMAGING | Facility: CLINIC | Age: 78
End: 2023-10-10
Payer: MEDICARE

## 2023-10-10 DIAGNOSIS — R94.39 ABNORMAL RESULT OF OTHER CARDIOVASCULAR FUNCTION STUDY: ICD-10-CM

## 2023-10-10 DIAGNOSIS — Z98.890 OTHER SPECIFIED POSTPROCEDURAL STATES: Chronic | ICD-10-CM

## 2023-10-10 PROCEDURE — 75574 CT ANGIO HRT W/3D IMAGE: CPT

## 2023-10-10 PROCEDURE — 75574 CT ANGIO HRT W/3D IMAGE: CPT | Mod: 26,MH

## 2023-10-22 PROBLEM — R06.02 SHORTNESS OF BREATH ON EXERTION: Status: ACTIVE | Noted: 2023-08-29

## 2023-10-22 PROBLEM — E11.9 DIABETES MELLITUS: Status: ACTIVE | Noted: 2017-06-27

## 2023-10-22 PROBLEM — I47.10 SVT (SUPRAVENTRICULAR TACHYCARDIA): Status: ACTIVE | Noted: 2019-04-24

## 2023-11-02 ENCOUNTER — OUTPATIENT (OUTPATIENT)
Dept: OUTPATIENT SERVICES | Facility: HOSPITAL | Age: 78
LOS: 1 days | End: 2023-11-02
Payer: MEDICARE

## 2023-11-02 ENCOUNTER — APPOINTMENT (OUTPATIENT)
Dept: CT IMAGING | Facility: CLINIC | Age: 78
End: 2023-11-02
Payer: MEDICARE

## 2023-11-02 DIAGNOSIS — Z00.8 ENCOUNTER FOR OTHER GENERAL EXAMINATION: ICD-10-CM

## 2023-11-02 DIAGNOSIS — Z98.890 OTHER SPECIFIED POSTPROCEDURAL STATES: Chronic | ICD-10-CM

## 2023-11-02 PROCEDURE — 71250 CT THORAX DX C-: CPT | Mod: MH

## 2023-11-02 PROCEDURE — 71250 CT THORAX DX C-: CPT | Mod: 26,MH

## 2023-11-07 NOTE — ED ADULT NURSE NOTE - CAS DISCH TRANSFER METHOD
-- DO NOT REPLY / DO NOT REPLY ALL --  -- Message is from [facility] ([facility]) --    General Patient Message: Patient needs post op up this week, needs return to work letter    Caller Information       Type Contact Phone/Fax    11/07/2023 03:30 PM CST Phone (Incoming) Venkat, Antonio (Self) [phone] (M)        Alternative phone number: none    Can a detailed message be left? Yes    Message Turnaround:     Is it Working Hours? Yes - Working Hours     [place]:    Please give this turnaround time to the caller:   \"This message will be sent to [state Provider's name]. The clinical team will fulfill your request as soon as they review your message.\"                 Private car

## 2024-06-27 NOTE — ED CDU PROVIDER SUBSEQUENT DAY NOTE - HISTORY
Where Is Your Acne Located?: face
No interval changes since initial CDU provider note. Pt feels well without complaint. NAD VSS. Erythema has not spread past demarcated outline. Will continue antibiotics in the setting of cellulitis.- JAYESH Cox

## 2024-06-30 ENCOUNTER — NON-APPOINTMENT (OUTPATIENT)
Age: 79
End: 2024-06-30

## 2024-08-14 ENCOUNTER — EMERGENCY (EMERGENCY)
Facility: HOSPITAL | Age: 79
LOS: 1 days | Discharge: HOME | End: 2024-08-14
Admitting: EMERGENCY MEDICINE
Payer: MEDICARE

## 2024-08-14 VITALS
DIASTOLIC BLOOD PRESSURE: 78 MMHG | OXYGEN SATURATION: 95 % | TEMPERATURE: 99 F | HEART RATE: 62 BPM | SYSTOLIC BLOOD PRESSURE: 142 MMHG | RESPIRATION RATE: 16 BRPM

## 2024-08-14 DIAGNOSIS — F43.29 ADJUSTMENT DISORDER WITH OTHER SYMPTOMS: ICD-10-CM

## 2024-08-14 DIAGNOSIS — F43.10 POST-TRAUMATIC STRESS DISORDER, UNSPECIFIED: ICD-10-CM

## 2024-08-14 DIAGNOSIS — Z98.890 OTHER SPECIFIED POSTPROCEDURAL STATES: Chronic | ICD-10-CM

## 2024-08-14 PROCEDURE — 99284 EMERGENCY DEPT VISIT MOD MDM: CPT | Mod: FS

## 2024-08-14 PROCEDURE — 99285 EMERGENCY DEPT VISIT HI MDM: CPT

## 2024-08-14 RX ORDER — CLONAZEPAM 0.5 MG/1
1 TABLET ORAL ONCE
Refills: 0 | Status: DISCONTINUED | OUTPATIENT
Start: 2024-08-14 | End: 2024-08-14

## 2024-08-14 RX ADMIN — CLONAZEPAM 1 MILLIGRAM(S): 0.5 TABLET ORAL at 21:13

## 2024-08-15 VITALS
RESPIRATION RATE: 16 BRPM | SYSTOLIC BLOOD PRESSURE: 150 MMHG | TEMPERATURE: 98 F | DIASTOLIC BLOOD PRESSURE: 82 MMHG | HEART RATE: 69 BPM | OXYGEN SATURATION: 97 %

## 2024-08-15 PROCEDURE — 99213 OFFICE O/P EST LOW 20 MIN: CPT

## 2024-08-15 RX ORDER — DILTIAZEM HYDROCHLORIDE 240 MG/1
120 TABLET, EXTENDED RELEASE ORAL ONCE
Refills: 0 | Status: COMPLETED | OUTPATIENT
Start: 2024-08-15 | End: 2024-08-15

## 2024-08-15 RX ORDER — GABAPENTIN 400 MG/1
300 CAPSULE ORAL ONCE
Refills: 0 | Status: COMPLETED | OUTPATIENT
Start: 2024-08-15 | End: 2024-08-15

## 2024-08-15 RX ORDER — VENLAFAXINE HYDROCHLORIDE 37.5 MG/1
150 CAPSULE, EXTENDED RELEASE ORAL ONCE
Refills: 0 | Status: COMPLETED | OUTPATIENT
Start: 2024-08-15 | End: 2024-08-15

## 2024-08-15 RX ADMIN — GABAPENTIN 300 MILLIGRAM(S): 400 CAPSULE ORAL at 09:37

## 2024-08-15 RX ADMIN — DILTIAZEM HYDROCHLORIDE 120 MILLIGRAM(S): 240 TABLET, EXTENDED RELEASE ORAL at 12:30

## 2024-08-15 RX ADMIN — VENLAFAXINE HYDROCHLORIDE 150 MILLIGRAM(S): 37.5 CAPSULE, EXTENDED RELEASE ORAL at 09:37

## 2024-09-12 NOTE — ED PROVIDER NOTE - TEMPLATE, MLM
Orientation to room/Bed in low position, brakes on/Side rails x 2 or 4 up, assess large gaps, such that a patient could get extremity or other body part entrapped, use additional safety procedures/Call light is within reach, educate patient/family on its functionality/Environment clear of unused equipment, furniture's in place, clear of hazards/Patient and family education available to parents and patient
General

## 2024-09-30 ENCOUNTER — OUTPATIENT (OUTPATIENT)
Dept: OUTPATIENT SERVICES | Facility: HOSPITAL | Age: 79
LOS: 1 days | End: 2024-09-30
Payer: MEDICARE

## 2024-09-30 ENCOUNTER — APPOINTMENT (OUTPATIENT)
Dept: RADIOLOGY | Facility: CLINIC | Age: 79
End: 2024-09-30
Payer: MEDICARE

## 2024-09-30 DIAGNOSIS — M25.559 PAIN IN UNSPECIFIED HIP: ICD-10-CM

## 2024-09-30 DIAGNOSIS — Z98.890 OTHER SPECIFIED POSTPROCEDURAL STATES: Chronic | ICD-10-CM

## 2024-09-30 PROCEDURE — 73521 X-RAY EXAM HIPS BI 2 VIEWS: CPT

## 2024-09-30 PROCEDURE — 72110 X-RAY EXAM L-2 SPINE 4/>VWS: CPT

## 2024-09-30 PROCEDURE — 73521 X-RAY EXAM HIPS BI 2 VIEWS: CPT | Mod: 26

## 2024-09-30 PROCEDURE — 72110 X-RAY EXAM L-2 SPINE 4/>VWS: CPT | Mod: 26

## 2024-10-02 ENCOUNTER — APPOINTMENT (OUTPATIENT)
Dept: CT IMAGING | Facility: CLINIC | Age: 79
End: 2024-10-02

## 2024-10-02 ENCOUNTER — OUTPATIENT (OUTPATIENT)
Dept: OUTPATIENT SERVICES | Facility: HOSPITAL | Age: 79
LOS: 1 days | End: 2024-10-02
Payer: MEDICARE

## 2024-10-02 DIAGNOSIS — Z98.890 OTHER SPECIFIED POSTPROCEDURAL STATES: Chronic | ICD-10-CM

## 2024-10-02 DIAGNOSIS — M47.816 SPONDYLOSIS WITHOUT MYELOPATHY OR RADICULOPATHY, LUMBAR REGION: ICD-10-CM

## 2024-10-02 DIAGNOSIS — M47.896 OTHER SPONDYLOSIS, LUMBAR REGION: ICD-10-CM

## 2024-10-02 PROCEDURE — G1004: CPT

## 2024-10-02 PROCEDURE — 76376 3D RENDER W/INTRP POSTPROCES: CPT | Mod: 26

## 2024-10-02 PROCEDURE — 72131 CT LUMBAR SPINE W/O DYE: CPT | Mod: 26,ME

## 2024-10-17 NOTE — ED ADULT NURSE NOTE - NSSISCREENINGQ5_ED_A_ED
Patient with history of depression and substance abuse presents with request for short course of Wellbutrin and naltrexone.  Patient's partner states that he has had success in sobriety taking these medications over the past month and she is concerned that he may be without them for the next week until he sees his new psychiatrist.  Patient states that he last took meds yesterday and would like to continue taking them.  Has a appointment with the psychiatrist in 1 week.  Patient is asymptomatic, feels well at this time.
No

## 2024-11-20 PROCEDURE — 72131 CT LUMBAR SPINE W/O DYE: CPT | Mod: ME

## 2024-11-20 PROCEDURE — G1004: CPT

## 2024-11-20 PROCEDURE — 76376 3D RENDER W/INTRP POSTPROCES: CPT

## 2024-11-20 NOTE — ED CDU PROVIDER SUBSEQUENT DAY NOTE - MEDICAL DECISION MAKING DETAILS
The site was marked. Prepped: right chest. Prepped with: ChloraPrep. The patient was draped. see attending statement below

## 2024-12-13 NOTE — H&P PST ADULT - HISTORY OF PRESENT ILLNESS
External [place]/External FHR 75 y/o male with PMH of 75 y/o male with PMH of PTSD, Pre- Diabetic, GERD , EMI on CPAP, SVT currently on Cardizem followed by cardiology . Pt had recent colonoscopy demonstrates a 2 cm mass in the appendiceal orifice. 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 Presents to PST for scheduled Laparoscopic Appendectomy on 7/15/19.

## 2025-03-24 NOTE — ED CDU PROVIDER INITIAL DAY NOTE - TIMING
sudden onset No. ERICH screening performed.  STOP BANG Legend: 0-2 = LOW Risk; 3-4 = INTERMEDIATE Risk; 5-8 = HIGH Risk

## 2025-07-08 ENCOUNTER — APPOINTMENT (OUTPATIENT)
Dept: OTHER | Facility: CLINIC | Age: 80
End: 2025-07-08
Payer: COMMERCIAL

## 2025-07-08 ENCOUNTER — LABORATORY RESULT (OUTPATIENT)
Age: 80
End: 2025-07-08

## 2025-07-08 VITALS — BODY MASS INDEX: 29.12 KG/M2 | WEIGHT: 175 LBS

## 2025-07-08 VITALS
HEART RATE: 65 BPM | OXYGEN SATURATION: 93 % | TEMPERATURE: 98.2 F | DIASTOLIC BLOOD PRESSURE: 68 MMHG | SYSTOLIC BLOOD PRESSURE: 145 MMHG | HEIGHT: 65 IN | RESPIRATION RATE: 16 BRPM

## 2025-07-08 PROBLEM — Z04.9 ENCOUNTER FOR OBSERVATION FOR SUSPECTED CONDITION: Status: ACTIVE | Noted: 2025-07-08

## 2025-07-08 PROCEDURE — 99387 INIT PM E/M NEW PAT 65+ YRS: CPT | Mod: 25

## 2025-07-15 LAB
ALBUMIN SERPL ELPH-MCNC: 4.6 G/DL
ALP BLD-CCNC: 73 U/L
ALT SERPL-CCNC: 38 U/L
ANION GAP SERPL CALC-SCNC: 11 MMOL/L
APPEARANCE: ABNORMAL
AST SERPL-CCNC: 32 U/L
BASOPHILS # BLD AUTO: 0.07 K/UL
BASOPHILS NFR BLD AUTO: 0.9 %
BILIRUB SERPL-MCNC: 0.6 MG/DL
BILIRUBIN URINE: NEGATIVE
BLOOD URINE: NEGATIVE
BUN SERPL-MCNC: 30 MG/DL
CALCIUM SERPL-MCNC: 10.2 MG/DL
CHLORIDE SERPL-SCNC: 99 MMOL/L
CHOLEST SERPL-MCNC: 157 MG/DL
CO2 SERPL-SCNC: 25 MMOL/L
COLOR: YELLOW
CREAT SERPL-MCNC: 0.99 MG/DL
EGFRCR SERPLBLD CKD-EPI 2021: 77 ML/MIN/1.73M2
EOSINOPHIL # BLD AUTO: 0.22 K/UL
EOSINOPHIL NFR BLD AUTO: 3 %
GLUCOSE QUALITATIVE U: NEGATIVE MG/DL
GLUCOSE SERPL-MCNC: 118 MG/DL
HCT VFR BLD CALC: 43.1 %
HDLC SERPL-MCNC: 56 MG/DL
HGB BLD-MCNC: 14.8 G/DL
IMM GRANULOCYTES NFR BLD AUTO: 0.3 %
KETONES URINE: NEGATIVE MG/DL
LDLC SERPL-MCNC: 83 MG/DL
LEUKOCYTE ESTERASE URINE: ABNORMAL
LYMPHOCYTES # BLD AUTO: 1.65 K/UL
LYMPHOCYTES NFR BLD AUTO: 22.3 %
MAN DIFF?: NORMAL
MCHC RBC-ENTMCNC: 31 PG
MCHC RBC-ENTMCNC: 34.3 G/DL
MCV RBC AUTO: 90.4 FL
MONOCYTES # BLD AUTO: 0.89 K/UL
MONOCYTES NFR BLD AUTO: 12 %
NEUTROPHILS # BLD AUTO: 4.54 K/UL
NEUTROPHILS NFR BLD AUTO: 61.5 %
NITRITE URINE: POSITIVE
NONHDLC SERPL-MCNC: 101 MG/DL
PH URINE: 8
PLATELET # BLD AUTO: 282 K/UL
POTASSIUM SERPL-SCNC: 5 MMOL/L
PROT SERPL-MCNC: 7.4 G/DL
PROTEIN URINE: NORMAL MG/DL
RBC # BLD: 4.77 M/UL
RBC # FLD: 12.9 %
SODIUM SERPL-SCNC: 134 MMOL/L
SPECIFIC GRAVITY URINE: 1.02
TRIGL SERPL-MCNC: 103 MG/DL
UROBILINOGEN URINE: 1 MG/DL
WBC # FLD AUTO: 7.39 K/UL